# Patient Record
Sex: MALE | Race: WHITE | NOT HISPANIC OR LATINO | Employment: FULL TIME | URBAN - METROPOLITAN AREA
[De-identification: names, ages, dates, MRNs, and addresses within clinical notes are randomized per-mention and may not be internally consistent; named-entity substitution may affect disease eponyms.]

---

## 2018-08-28 ENCOUNTER — OFFICE VISIT (OUTPATIENT)
Dept: ORTHOPEDICS | Facility: CLINIC | Age: 58
End: 2018-08-28
Payer: COMMERCIAL

## 2018-08-28 VITALS
BODY MASS INDEX: 26.44 KG/M2 | SYSTOLIC BLOOD PRESSURE: 131 MMHG | DIASTOLIC BLOOD PRESSURE: 76 MMHG | WEIGHT: 206 LBS | HEIGHT: 74 IN | HEART RATE: 64 BPM

## 2018-08-28 DIAGNOSIS — M75.42 SUBACROMIAL IMPINGEMENT OF LEFT SHOULDER: ICD-10-CM

## 2018-08-28 DIAGNOSIS — M75.41 SUBACROMIAL IMPINGEMENT, RIGHT: Primary | ICD-10-CM

## 2018-08-28 PROCEDURE — 20610 DRAIN/INJ JOINT/BURSA W/O US: CPT | Mod: 50,,, | Performed by: ORTHOPAEDIC SURGERY

## 2018-08-28 PROCEDURE — 3008F BODY MASS INDEX DOCD: CPT | Mod: ,,, | Performed by: ORTHOPAEDIC SURGERY

## 2018-08-28 PROCEDURE — 99213 OFFICE O/P EST LOW 20 MIN: CPT | Mod: 25,,, | Performed by: ORTHOPAEDIC SURGERY

## 2018-08-28 RX ORDER — ATORVASTATIN CALCIUM 40 MG/1
40 TABLET, FILM COATED ORAL NIGHTLY
COMMUNITY
End: 2022-03-31 | Stop reason: SDUPTHER

## 2018-08-28 RX ORDER — MELOXICAM 15 MG/1
TABLET ORAL
Refills: 1 | COMMUNITY
Start: 2018-06-05 | End: 2021-06-07

## 2018-08-28 RX ORDER — METHYLPREDNISOLONE ACETATE 40 MG/ML
40 INJECTION, SUSPENSION INTRA-ARTICULAR; INTRALESIONAL; INTRAMUSCULAR; SOFT TISSUE
Status: DISCONTINUED | OUTPATIENT
Start: 2018-08-28 | End: 2018-08-28 | Stop reason: HOSPADM

## 2018-08-28 RX ORDER — PANTOPRAZOLE SODIUM 20 MG/1
TABLET, DELAYED RELEASE ORAL
Refills: 3 | COMMUNITY
Start: 2018-06-08 | End: 2019-04-16

## 2018-08-28 RX ORDER — FLUTICASONE PROPIONATE 50 MCG
SPRAY, SUSPENSION (ML) NASAL
COMMUNITY
End: 2022-10-11

## 2018-08-28 RX ORDER — ACETAMINOPHEN, DIPHENHYDRAMINE HCL, PHENYLEPHRINE HCL 325; 25; 5 MG/1; MG/1; MG/1
TABLET ORAL
COMMUNITY
End: 2021-06-07

## 2018-08-28 RX ORDER — AZELASTINE 1 MG/ML
SPRAY, METERED NASAL
COMMUNITY
End: 2021-05-03

## 2018-08-28 RX ADMIN — METHYLPREDNISOLONE ACETATE 40 MG: 40 INJECTION, SUSPENSION INTRA-ARTICULAR; INTRALESIONAL; INTRAMUSCULAR; SOFT TISSUE at 09:08

## 2018-08-28 NOTE — PROCEDURES
Large Joint Aspiration/Injection: R subacromial bursa  Date/Time: 8/28/2018 9:20 AM  Performed by: Zack Siddiqui MD  Authorized by: Zack Siddiqui MD     Consent Done?:  Yes (Verbal)  Indications:  Pain  Procedure site marked: Yes    Timeout: Prior to procedure the correct patient, procedure, and site was verified      Location:  Shoulder  Site:  R subacromial bursa  Prep: Patient was prepped and draped in usual sterile fashion    Needle size:  25 G  Medications:  40 mg methylPREDNISolone acetate 40 mg/mL; 40 mg methylPREDNISolone acetate 40 mg/mL  Patient tolerance:  Patient tolerated the procedure well with no immediate complications  Large Joint Aspiration/Injection: L subacromial bursa  Date/Time: 8/28/2018 9:21 AM  Performed by: Zack Siddiqui MD  Authorized by: Zack Siddiqui MD     Consent Done?:  Yes (Verbal)  Indications:  Pain  Procedure site marked: Yes    Timeout: Prior to procedure the correct patient, procedure, and site was verified      Location:  Shoulder  Site:  L subacromial bursa  Prep: Patient was prepped and draped in usual sterile fashion    Needle size:  25 G  Medications:  40 mg methylPREDNISolone acetate 40 mg/mL; 40 mg methylPREDNISolone acetate 40 mg/mL  Patient tolerance:  Patient tolerated the procedure well with no immediate complications

## 2018-08-28 NOTE — PROGRESS NOTES
[facility]    Subjective:     Chief Complaint:   Chief Complaint   Patient presents with    Right Shoulder - Pain     Right Shoulder pain for about a month, states he started a workout program and that's when he started feeling pain. Hurts to raise arm up. States it hurts to pick objects up as well. Requesting injection    Left Shoulder - Pain     Left Shoulder pain for about a month, states he started a workout program and that's when he started feeling pain. Hurts to raise arm up. States it hurts to pick objects up as well. Requesting injection       History reviewed. No pertinent past medical history.    Past Surgical History:   Procedure Laterality Date    APPENDECTOMY      TONSILLECTOMY         Current Outpatient Medications   Medication Sig    atorvastatin (LIPITOR) 40 MG tablet atorvastatin 40 mg tablet    azelastine (ASTELIN) 137 mcg (0.1 %) nasal spray azelastine 137 mcg (0.1 %) nasal spray aerosol    fluticasone (FLONASE) 50 mcg/actuation nasal spray fluticasone 50 mcg/actuation nasal spray,suspension    melatonin 10 mg Tab melatonin   3mg one daily    meloxicam (MOBIC) 15 MG tablet TK 1 T PO QD    pantoprazole (PROTONIX) 20 MG tablet TK 2 TS PO QD     No current facility-administered medications for this visit.        Review of patient's allergies indicates:  No Known Allergies    Family History   Problem Relation Age of Onset    Cancer Mother 70        colon, breast    Diabetes Mother     Cancer Father 65        prostate    Heart disease Brother         CHF       Social History     Socioeconomic History    Marital status:      Spouse name: Not on file    Number of children: Not on file    Years of education: Not on file    Highest education level: Not on file   Social Needs    Financial resource strain: Not on file    Food insecurity - worry: Not on file    Food insecurity - inability: Not on file    Transportation needs - medical: Not on file    Transportation  needs - non-medical: Not on file   Occupational History     Employer: [employer]   Tobacco Use    Smoking status: Former Smoker     Packs/day: 2.00     Years: 20.00     Pack years: 40.00     Types: Cigarettes     Last attempt to quit: 1993     Years since quittin.3    Smokeless tobacco: Never Used   Substance and Sexual Activity    Alcohol use: Yes     Alcohol/week: 3.6 oz     Types: 6 Cans of beer per week    Drug use: No    Sexual activity: Not on file   Other Topics Concern    Not on file   Social History Narrative    Not on file       History of present illness: In today with a chief complaint of bilateral shoulder pain. The left is worse in the right but both bother him. This been going on for about a month. He's been working out a lot and he thinks that's where it all started. No other trauma.      Review of Systems:    Constitution: Negative for chills, fever, and sweats.  Negative for unexplained weight loss.    HENT:  Negative for headaches and blurry vision.    Cardiovascular:Negative for chest pain or irregular heart beat. Negative for hypertension.    Respiratory:  Negative for cough and shortness of breath.    Gastrointestinal: Negative for abdominal pain, heartburn, melena, nausea, and vomitting.    Genitourinary:  Negative bladder incontinence and dysuria.    Musculoskeletal:  See HPI for details.     Neurological: Negative for numbness.    Psychiatric/Behavioral: Negative for depression.  The patient is not nervous/anxious.      Endocrine: Negative for polyuria    Hematologic/Lymphatic: Negative for bleeding problem.  Does not bruise/bleed easily.    Skin: Negative for poor would healing and rash    Objective:      Physical Examination:    Vital Signs:    Vitals:    18 0854   BP: 131/76   Pulse: 64       Body mass index is 26.45 kg/m².    This a well-developed, well nourished patient in no acute distress.  They are alert and oriented and cooperative to examination.         Patient has full range of motion of the bilateral shoulders. He has positive impingement on the left. Positive impingement on the right. Both his rotator cuff is grossly intact but mildly tender. Spurling sign is negative bilaterally.  Pertinent New Results:    XRAY Report / Interpretation:   AP and lateral of the right shoulder demonstrates a type II acromion. There is acromioclavicular arthrosis. No glenohumeral arthrosis. No fractures or subluxations.    AP and lateral the left shoulder demonstrates acromioclavicular arthrosis. No fractures or subluxations. Type II acromion. No glenohumeral arthrosis. All x-rays again in the office today    Assessment/Plan:      Bilateral impingement syndrome. I injected both shoulders with Depo-Medrol and lidocaine. He will follow-up in 6 weeks      This note was created using Dragon voice recognition software that occasionally misinterpreted phrases or words.

## 2019-04-16 ENCOUNTER — OFFICE VISIT (OUTPATIENT)
Dept: ORTHOPEDICS | Facility: CLINIC | Age: 59
End: 2019-04-16
Payer: COMMERCIAL

## 2019-04-16 VITALS
DIASTOLIC BLOOD PRESSURE: 88 MMHG | HEART RATE: 63 BPM | SYSTOLIC BLOOD PRESSURE: 130 MMHG | BODY MASS INDEX: 28.88 KG/M2 | WEIGHT: 225 LBS | HEIGHT: 74 IN

## 2019-04-16 DIAGNOSIS — M77.01 MEDIAL EPICONDYLITIS OF RIGHT ELBOW: ICD-10-CM

## 2019-04-16 PROCEDURE — 20551 NJX 1 TENDON ORIGIN/INSJ: CPT | Mod: RT,,, | Performed by: ORTHOPAEDIC SURGERY

## 2019-04-16 PROCEDURE — 20551 TENDON ORIGIN: R ELBOW: ICD-10-PCS | Mod: RT,,, | Performed by: ORTHOPAEDIC SURGERY

## 2019-04-16 PROCEDURE — 3008F PR BODY MASS INDEX (BMI) DOCUMENTED: ICD-10-PCS | Mod: ,,, | Performed by: ORTHOPAEDIC SURGERY

## 2019-04-16 PROCEDURE — 73070 X-RAY EXAM OF ELBOW: CPT | Mod: RT,,, | Performed by: ORTHOPAEDIC SURGERY

## 2019-04-16 PROCEDURE — 73070 PR  X-RAY ELBOW 2 VW: ICD-10-PCS | Mod: RT,,, | Performed by: ORTHOPAEDIC SURGERY

## 2019-04-16 PROCEDURE — 99213 PR OFFICE/OUTPT VISIT, EST, LEVL III, 20-29 MIN: ICD-10-PCS | Mod: 25,,, | Performed by: ORTHOPAEDIC SURGERY

## 2019-04-16 PROCEDURE — 99213 OFFICE O/P EST LOW 20 MIN: CPT | Mod: 25,,, | Performed by: ORTHOPAEDIC SURGERY

## 2019-04-16 PROCEDURE — 3008F BODY MASS INDEX DOCD: CPT | Mod: ,,, | Performed by: ORTHOPAEDIC SURGERY

## 2019-04-16 RX ORDER — METHYLPREDNISOLONE ACETATE 40 MG/ML
40 INJECTION, SUSPENSION INTRA-ARTICULAR; INTRALESIONAL; INTRAMUSCULAR; SOFT TISSUE
Status: DISCONTINUED | OUTPATIENT
Start: 2019-04-16 | End: 2019-04-16 | Stop reason: HOSPADM

## 2019-04-16 RX ADMIN — METHYLPREDNISOLONE ACETATE 40 MG: 40 INJECTION, SUSPENSION INTRA-ARTICULAR; INTRALESIONAL; INTRAMUSCULAR; SOFT TISSUE at 09:04

## 2019-04-16 NOTE — PROCEDURES
Tendon Origin: R elbow  Date/Time: 4/16/2019 9:57 AM  Performed by: Zack Siddiqui MD  Authorized by: Zack Siddiqui MD     Consent Done?:  Yes (Verbal)  Timeout: prior to procedure the correct patient, procedure, and site was verified    Indications:  Pain  Site marked: the procedure site was marked    Timeout: prior to procedure the correct patient, procedure, and site was verified    Location:  Elbow  Site:  R elbow  Prep: patient was prepped and draped in usual sterile fashion    Needle size:  25 G  Medications:  40 mg methylPREDNISolone acetate 40 mg/mL  Patient tolerance:  Patient tolerated the procedure well with no immediate complications

## 2019-04-16 NOTE — PROGRESS NOTES
[facility]    Subjective:     Chief Complaint:   Chief Complaint   Patient presents with    Right Elbow - Pain     Right elbow pain x 6 weeks. States that his elbow bothers him the most when he is lifting something up.         History reviewed. No pertinent past medical history.    Past Surgical History:   Procedure Laterality Date    APPENDECTOMY      TONSILLECTOMY         Current Outpatient Medications   Medication Sig    atorvastatin (LIPITOR) 40 MG tablet atorvastatin 40 mg tablet    azelastine (ASTELIN) 137 mcg (0.1 %) nasal spray azelastine 137 mcg (0.1 %) nasal spray aerosol    fluticasone (FLONASE) 50 mcg/actuation nasal spray fluticasone 50 mcg/actuation nasal spray,suspension    melatonin 10 mg Tab melatonin   3mg one daily    meloxicam (MOBIC) 15 MG tablet TK 1 T PO QD     No current facility-administered medications for this visit.        Review of patient's allergies indicates:  No Known Allergies    Family History   Problem Relation Age of Onset    Cancer Mother 70        colon, breast    Diabetes Mother     Cancer Father 65        prostate    Heart disease Brother         CHF       Social History     Socioeconomic History    Marital status:      Spouse name: Not on file    Number of children: Not on file    Years of education: Not on file    Highest education level: Not on file   Occupational History     Employer: [employer]   Social Needs    Financial resource strain: Not on file    Food insecurity:     Worry: Not on file     Inability: Not on file    Transportation needs:     Medical: Not on file     Non-medical: Not on file   Tobacco Use    Smoking status: Former Smoker     Packs/day: 2.00     Years: 20.00     Pack years: 40.00     Types: Cigarettes     Last attempt to quit: 1993     Years since quittin.9    Smokeless tobacco: Never Used   Substance and Sexual Activity    Alcohol use: Yes     Alcohol/week: 3.6 oz     Types: 6 Cans of beer per  week    Drug use: No    Sexual activity: Not on file   Lifestyle    Physical activity:     Days per week: Not on file     Minutes per session: Not on file    Stress: Not on file   Relationships    Social connections:     Talks on phone: Not on file     Gets together: Not on file     Attends Spiritism service: Not on file     Active member of club or organization: Not on file     Attends meetings of clubs or organizations: Not on file     Relationship status: Not on file   Other Topics Concern    Not on file   Social History Narrative    Not on file       History of present illness: Patient comes in today for her right elbow. He's had about 6 weeks of right elbow pain. This all started when he was chopping wood. The pain is in the medial aspect of the right elbow.      Review of Systems:    Constitution: Negative for chills, fever, and sweats.  Negative for unexplained weight loss.    HENT:  Negative for headaches and blurry vision.    Cardiovascular:Negative for chest pain or irregular heart beat. Negative for hypertension.    Respiratory:  Negative for cough and shortness of breath.    Gastrointestinal: Negative for abdominal pain, heartburn, melena, nausea, and vomitting.    Genitourinary:  Negative bladder incontinence and dysuria.    Musculoskeletal:  See HPI for details.     Neurological: Negative for numbness.    Psychiatric/Behavioral: Negative for depression.  The patient is not nervous/anxious.      Endocrine: Negative for polyuria    Hematologic/Lymphatic: Negative for bleeding problem.  Does not bruise/bleed easily.    Skin: Negative for poor would healing and rash    Objective:      Physical Examination:    Vital Signs:    Vitals:    04/16/19 0913   BP: 130/88   Pulse: 63       Body mass index is 28.89 kg/m².    This a well-developed, well nourished patient in no acute distress.  They are alert and oriented and cooperative to examination.        Patient has full range of motion of the right elbow.  He's tender of the medial epicondylar region. He has no pain of the lateral epicondylar region. He has pain with flexion of the fingers but no pain with extension of the fingers. Full range of motion of the left elbow. Spurling sign is negative.  Pertinent New Results:    XRAY Report / Interpretation:   AP and lateral of the right elbow within normal limits no fracture subluxations or osteophytic changes. X-rays were done in the office today.    Assessment/Plan:      Medial epicondylitis. I injected the medial epicondylar region with Depo-Medrol and lidocaine. I started him on physical therapy. Follow-up in 6 weeks if he symptomatic      This note was created using Dragon voice recognition software that occasionally misinterpreted phrases or words.

## 2019-08-08 DIAGNOSIS — Z12.39 SCREENING BREAST EXAMINATION: Primary | ICD-10-CM

## 2019-08-15 ENCOUNTER — HOSPITAL ENCOUNTER (OUTPATIENT)
Dept: RADIOLOGY | Facility: HOSPITAL | Age: 59
Discharge: HOME | End: 2019-08-15
Attending: INTERNAL MEDICINE
Payer: COMMERCIAL

## 2019-08-15 ENCOUNTER — OFFICE VISIT (OUTPATIENT)
Dept: ORTHOPEDICS | Facility: CLINIC | Age: 59
End: 2019-08-15
Payer: COMMERCIAL

## 2019-08-15 VITALS
HEART RATE: 82 BPM | WEIGHT: 225 LBS | SYSTOLIC BLOOD PRESSURE: 112 MMHG | HEIGHT: 74 IN | DIASTOLIC BLOOD PRESSURE: 82 MMHG | BODY MASS INDEX: 28.88 KG/M2

## 2019-08-15 DIAGNOSIS — Z12.39 SCREENING BREAST EXAMINATION: ICD-10-CM

## 2019-08-15 DIAGNOSIS — M75.41 IMPINGEMENT SYNDROME OF RIGHT SHOULDER: Primary | ICD-10-CM

## 2019-08-15 DIAGNOSIS — M77.01 MEDIAL EPICONDYLITIS OF RIGHT ELBOW: ICD-10-CM

## 2019-08-15 LAB
CREAT SERPL-MCNC: 1.1 MG/DL (ref 0.5–1.4)
SAMPLE: NORMAL

## 2019-08-15 PROCEDURE — 20610 LARGE JOINT ASPIRATION/INJECTION: R SUBACROMIAL BURSA: ICD-10-PCS | Mod: RT,S$GLB,, | Performed by: ORTHOPAEDIC SURGERY

## 2019-08-15 PROCEDURE — 74177 CT ABD & PELVIS W/CONTRAST: CPT | Mod: TC,PO

## 2019-08-15 PROCEDURE — 99213 OFFICE O/P EST LOW 20 MIN: CPT | Mod: 25,S$GLB,, | Performed by: ORTHOPAEDIC SURGERY

## 2019-08-15 PROCEDURE — 3008F BODY MASS INDEX DOCD: CPT | Mod: S$GLB,,, | Performed by: ORTHOPAEDIC SURGERY

## 2019-08-15 PROCEDURE — 25500020 PHARM REV CODE 255: Mod: PO | Performed by: INTERNAL MEDICINE

## 2019-08-15 PROCEDURE — 20551 NJX 1 TENDON ORIGIN/INSJ: CPT | Mod: 59,RT,S$GLB, | Performed by: ORTHOPAEDIC SURGERY

## 2019-08-15 PROCEDURE — 3008F PR BODY MASS INDEX (BMI) DOCUMENTED: ICD-10-PCS | Mod: S$GLB,,, | Performed by: ORTHOPAEDIC SURGERY

## 2019-08-15 PROCEDURE — 99213 PR OFFICE/OUTPT VISIT, EST, LEVL III, 20-29 MIN: ICD-10-PCS | Mod: 25,S$GLB,, | Performed by: ORTHOPAEDIC SURGERY

## 2019-08-15 PROCEDURE — 20610 DRAIN/INJ JOINT/BURSA W/O US: CPT | Mod: RT,S$GLB,, | Performed by: ORTHOPAEDIC SURGERY

## 2019-08-15 PROCEDURE — 20551 TENDON ORIGIN: R ELBOW: ICD-10-PCS | Mod: 59,RT,S$GLB, | Performed by: ORTHOPAEDIC SURGERY

## 2019-08-15 RX ORDER — METHYLPREDNISOLONE ACETATE 40 MG/ML
40 INJECTION, SUSPENSION INTRA-ARTICULAR; INTRALESIONAL; INTRAMUSCULAR; SOFT TISSUE
Status: DISCONTINUED | OUTPATIENT
Start: 2019-08-15 | End: 2019-08-15 | Stop reason: HOSPADM

## 2019-08-15 RX ORDER — FAMOTIDINE 40 MG/1
40 TABLET, FILM COATED ORAL 2 TIMES DAILY PRN
Refills: 0 | Status: ON HOLD | COMMUNITY
Start: 2019-07-31 | End: 2021-05-06

## 2019-08-15 RX ORDER — PANTOPRAZOLE SODIUM 20 MG/1
20 TABLET, DELAYED RELEASE ORAL
Refills: 3 | COMMUNITY
Start: 2019-07-23

## 2019-08-15 RX ORDER — CETIRIZINE HYDROCHLORIDE 10 MG/1
TABLET ORAL
Refills: 3 | COMMUNITY
Start: 2019-06-03 | End: 2022-06-09 | Stop reason: SDUPTHER

## 2019-08-15 RX ADMIN — IOHEXOL 100 ML: 350 INJECTION, SOLUTION INTRAVENOUS at 12:08

## 2019-08-15 RX ADMIN — METHYLPREDNISOLONE ACETATE 40 MG: 40 INJECTION, SUSPENSION INTRA-ARTICULAR; INTRALESIONAL; INTRAMUSCULAR; SOFT TISSUE at 03:08

## 2019-08-15 NOTE — PROCEDURES
Large Joint Aspiration/Injection: R subacromial bursa  Date/Time: 8/15/2019 3:17 PM  Performed by: Zack Siddiqui MD  Authorized by: Zack Siddiqui MD     Consent Done?:  Yes (Verbal)  Indications:  Pain  Procedure site marked: Yes    Timeout: Prior to procedure the correct patient, procedure, and site was verified    Anesthesia  Local anesthesia used  Anesthesia: local infiltration  Anesthetic: lidocaine 1% without epinephrine    Location:  Shoulder  Site:  R subacromial bursa  Prep: Patient was prepped and draped in usual sterile fashion    Needle size:  25 G  Medications:  40 mg methylPREDNISolone acetate 40 mg/mL  Patient tolerance:  Patient tolerated the procedure well with no immediate complications  Tendon Origin: R elbow  Date/Time: 8/15/2019 3:17 PM  Performed by: Zack Siddiqui MD  Authorized by: Zack Siddiqui MD     Consent Done?:  Yes (Verbal)  Timeout: prior to procedure the correct patient, procedure, and site was verified    Indications:  Pain  Site marked: the procedure site was marked    Timeout: prior to procedure the correct patient, procedure, and site was verified    Location:  Elbow  Site:  R elbow  Prep: patient was prepped and draped in usual sterile fashion    Needle size:  25 G  Medications:  40 mg methylPREDNISolone acetate 40 mg/mL  Patient tolerance:  Patient tolerated the procedure well with no immediate complications
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
## 2021-04-12 DIAGNOSIS — R31.1 BENIGN MICROSCOPIC HEMATURIA: Primary | ICD-10-CM

## 2021-04-21 ENCOUNTER — HOSPITAL ENCOUNTER (OUTPATIENT)
Dept: RADIOLOGY | Facility: HOSPITAL | Age: 61
Discharge: HOME | End: 2021-04-21
Attending: SPECIALIST
Payer: COMMERCIAL

## 2021-04-21 DIAGNOSIS — R31.1 BENIGN MICROSCOPIC HEMATURIA: ICD-10-CM

## 2021-04-21 PROCEDURE — 76770 US EXAM ABDO BACK WALL COMP: CPT | Mod: TC,PO

## 2021-05-03 ENCOUNTER — HOSPITAL ENCOUNTER (OUTPATIENT)
Dept: RADIOLOGY | Facility: HOSPITAL | Age: 61
Discharge: HOME | End: 2021-05-03
Attending: SPECIALIST
Payer: COMMERCIAL

## 2021-05-03 ENCOUNTER — HOSPITAL ENCOUNTER (OUTPATIENT)
Dept: PREADMISSION TESTING | Facility: HOSPITAL | Age: 61
Discharge: HOME | End: 2021-05-03
Attending: SPECIALIST
Payer: COMMERCIAL

## 2021-05-03 VITALS
WEIGHT: 240.31 LBS | HEIGHT: 74 IN | OXYGEN SATURATION: 97 % | SYSTOLIC BLOOD PRESSURE: 137 MMHG | RESPIRATION RATE: 16 BRPM | DIASTOLIC BLOOD PRESSURE: 87 MMHG | HEART RATE: 66 BPM | BODY MASS INDEX: 30.84 KG/M2

## 2021-05-03 DIAGNOSIS — D41.4 NEOPLASM OF UNCERTAIN BEHAVIOR OF BLADDER: Primary | ICD-10-CM

## 2021-05-03 DIAGNOSIS — D41.4 NEOPLASM OF UNCERTAIN BEHAVIOR OF BLADDER: ICD-10-CM

## 2021-05-03 DIAGNOSIS — Z01.818 PREOP TESTING: Primary | ICD-10-CM

## 2021-05-03 LAB — SARS-COV-2 RDRP RESP QL NAA+PROBE: NEGATIVE

## 2021-05-03 PROCEDURE — 71046 X-RAY EXAM CHEST 2 VIEWS: CPT | Mod: TC

## 2021-05-03 PROCEDURE — 93010 EKG 12-LEAD: ICD-10-PCS | Mod: ,,, | Performed by: INTERNAL MEDICINE

## 2021-05-03 PROCEDURE — U0002 COVID-19 LAB TEST NON-CDC: HCPCS | Performed by: SPECIALIST

## 2021-05-03 PROCEDURE — 93005 ELECTROCARDIOGRAM TRACING: CPT | Performed by: INTERNAL MEDICINE

## 2021-05-03 PROCEDURE — 93010 ELECTROCARDIOGRAM REPORT: CPT | Mod: ,,, | Performed by: INTERNAL MEDICINE

## 2021-05-03 RX ORDER — CEFAZOLIN SODIUM 2 G/50ML
2 SOLUTION INTRAVENOUS ONCE
Status: CANCELLED | OUTPATIENT
Start: 2021-05-06

## 2021-05-03 RX ORDER — GLUCOSAMINE/CHONDRO SU A 500-400 MG
1 TABLET ORAL DAILY
COMMUNITY
End: 2023-04-12

## 2021-05-06 ENCOUNTER — ANESTHESIA EVENT (OUTPATIENT)
Dept: SURGERY | Facility: HOSPITAL | Age: 61
End: 2021-05-06
Payer: COMMERCIAL

## 2021-05-06 ENCOUNTER — ANESTHESIA (OUTPATIENT)
Dept: SURGERY | Facility: HOSPITAL | Age: 61
End: 2021-05-06
Payer: COMMERCIAL

## 2021-05-06 ENCOUNTER — PATIENT MESSAGE (OUTPATIENT)
Dept: RESEARCH | Facility: HOSPITAL | Age: 61
End: 2021-05-06

## 2021-05-06 ENCOUNTER — HOSPITAL ENCOUNTER (OUTPATIENT)
Facility: HOSPITAL | Age: 61
Discharge: HOME | End: 2021-05-06
Attending: SPECIALIST | Admitting: SPECIALIST
Payer: COMMERCIAL

## 2021-05-06 VITALS
WEIGHT: 240.31 LBS | OXYGEN SATURATION: 96 % | HEIGHT: 74 IN | RESPIRATION RATE: 16 BRPM | BODY MASS INDEX: 30.84 KG/M2 | HEART RATE: 70 BPM | SYSTOLIC BLOOD PRESSURE: 150 MMHG | TEMPERATURE: 98 F | DIASTOLIC BLOOD PRESSURE: 83 MMHG

## 2021-05-06 DIAGNOSIS — Z01.818 PREOP TESTING: ICD-10-CM

## 2021-05-06 DIAGNOSIS — C67.9 MALIGNANT NEOPLASM OF URINARY BLADDER, UNSPECIFIED SITE: Primary | ICD-10-CM

## 2021-05-06 PROCEDURE — 63600175 PHARM REV CODE 636 W HCPCS: Performed by: ANESTHESIOLOGY

## 2021-05-06 PROCEDURE — 25000003 PHARM REV CODE 250: Performed by: ANESTHESIOLOGY

## 2021-05-06 PROCEDURE — 37000009 HC ANESTHESIA EA ADD 15 MINS: Performed by: SPECIALIST

## 2021-05-06 PROCEDURE — 27000284 HC CANNULA NASAL: Performed by: ANESTHESIOLOGY

## 2021-05-06 PROCEDURE — 25000003 PHARM REV CODE 250: Performed by: SPECIALIST

## 2021-05-06 PROCEDURE — 25000003 PHARM REV CODE 250: Performed by: NURSE ANESTHETIST, CERTIFIED REGISTERED

## 2021-05-06 PROCEDURE — 71000039 HC RECOVERY, EACH ADD'L HOUR: Performed by: SPECIALIST

## 2021-05-06 PROCEDURE — 27202107 HC XP QUATRO SENSOR: Performed by: ANESTHESIOLOGY

## 2021-05-06 PROCEDURE — 37000008 HC ANESTHESIA 1ST 15 MINUTES: Performed by: SPECIALIST

## 2021-05-06 PROCEDURE — 71000033 HC RECOVERY, INTIAL HOUR: Performed by: SPECIALIST

## 2021-05-06 PROCEDURE — 36000706: Performed by: SPECIALIST

## 2021-05-06 PROCEDURE — 27000080 OPTIME MED/SURG SUP & DEVICES GENERAL CLASSIFICATION: Performed by: SPECIALIST

## 2021-05-06 PROCEDURE — 27000673 HC TUBING BLOOD Y: Performed by: ANESTHESIOLOGY

## 2021-05-06 PROCEDURE — 63600175 PHARM REV CODE 636 W HCPCS: Performed by: NURSE ANESTHETIST, CERTIFIED REGISTERED

## 2021-05-06 PROCEDURE — 27201107 HC STYLET, STANDARD: Performed by: ANESTHESIOLOGY

## 2021-05-06 PROCEDURE — 27000654 HC CATH IV JELCO: Performed by: ANESTHESIOLOGY

## 2021-05-06 PROCEDURE — 36000707: Performed by: SPECIALIST

## 2021-05-06 PROCEDURE — 63600175 PHARM REV CODE 636 W HCPCS

## 2021-05-06 PROCEDURE — 63600175 PHARM REV CODE 636 W HCPCS: Performed by: SPECIALIST

## 2021-05-06 PROCEDURE — 27201423 OPTIME MED/SURG SUP & DEVICES STERILE SUPPLY: Performed by: SPECIALIST

## 2021-05-06 RX ORDER — ONDANSETRON 2 MG/ML
INJECTION INTRAMUSCULAR; INTRAVENOUS
Status: DISCONTINUED | OUTPATIENT
Start: 2021-05-06 | End: 2021-05-06

## 2021-05-06 RX ORDER — ATROPA BELLADONNA AND OPIUM 16.2; 6 MG/1; MG/1
60 SUPPOSITORY RECTAL ONCE
Status: COMPLETED | OUTPATIENT
Start: 2021-05-06 | End: 2021-05-06

## 2021-05-06 RX ORDER — FENTANYL CITRATE 50 UG/ML
INJECTION, SOLUTION INTRAMUSCULAR; INTRAVENOUS
Status: DISCONTINUED | OUTPATIENT
Start: 2021-05-06 | End: 2021-05-06

## 2021-05-06 RX ORDER — HYDROMORPHONE HYDROCHLORIDE 1 MG/ML
0.2 INJECTION, SOLUTION INTRAMUSCULAR; INTRAVENOUS; SUBCUTANEOUS EVERY 5 MIN PRN
Status: DISCONTINUED | OUTPATIENT
Start: 2021-05-06 | End: 2021-05-06 | Stop reason: HOSPADM

## 2021-05-06 RX ORDER — ONDANSETRON 2 MG/ML
4 INJECTION INTRAMUSCULAR; INTRAVENOUS DAILY PRN
Status: DISCONTINUED | OUTPATIENT
Start: 2021-05-06 | End: 2021-05-06 | Stop reason: HOSPADM

## 2021-05-06 RX ORDER — MIDAZOLAM HYDROCHLORIDE 1 MG/ML
INJECTION INTRAMUSCULAR; INTRAVENOUS
Status: DISCONTINUED | OUTPATIENT
Start: 2021-05-06 | End: 2021-05-06

## 2021-05-06 RX ORDER — DEXAMETHASONE SODIUM PHOSPHATE 4 MG/ML
INJECTION, SOLUTION INTRA-ARTICULAR; INTRALESIONAL; INTRAMUSCULAR; INTRAVENOUS; SOFT TISSUE
Status: DISCONTINUED | OUTPATIENT
Start: 2021-05-06 | End: 2021-05-06

## 2021-05-06 RX ORDER — LIDOCAINE HYDROCHLORIDE 10 MG/ML
INJECTION, SOLUTION EPIDURAL; INFILTRATION; INTRACAUDAL; PERINEURAL
Status: DISCONTINUED | OUTPATIENT
Start: 2021-05-06 | End: 2021-05-06

## 2021-05-06 RX ORDER — LIDOCAINE HYDROCHLORIDE 20 MG/ML
JELLY TOPICAL
Status: DISCONTINUED | OUTPATIENT
Start: 2021-05-06 | End: 2021-05-06 | Stop reason: HOSPADM

## 2021-05-06 RX ORDER — ACETAMINOPHEN 10 MG/ML
INJECTION, SOLUTION INTRAVENOUS
Status: DISCONTINUED | OUTPATIENT
Start: 2021-05-06 | End: 2021-05-06

## 2021-05-06 RX ORDER — LABETALOL HYDROCHLORIDE 5 MG/ML
5 INJECTION, SOLUTION INTRAVENOUS ONCE
Status: COMPLETED | OUTPATIENT
Start: 2021-05-06 | End: 2021-05-06

## 2021-05-06 RX ORDER — PROPOFOL 10 MG/ML
VIAL (ML) INTRAVENOUS
Status: DISCONTINUED | OUTPATIENT
Start: 2021-05-06 | End: 2021-05-06

## 2021-05-06 RX ORDER — SODIUM CHLORIDE, SODIUM LACTATE, POTASSIUM CHLORIDE, CALCIUM CHLORIDE 600; 310; 30; 20 MG/100ML; MG/100ML; MG/100ML; MG/100ML
INJECTION, SOLUTION INTRAVENOUS CONTINUOUS PRN
Status: DISCONTINUED | OUTPATIENT
Start: 2021-05-06 | End: 2021-05-06

## 2021-05-06 RX ORDER — ROCURONIUM BROMIDE 10 MG/ML
INJECTION, SOLUTION INTRAVENOUS
Status: DISCONTINUED | OUTPATIENT
Start: 2021-05-06 | End: 2021-05-06

## 2021-05-06 RX ORDER — SUCCINYLCHOLINE CHLORIDE 20 MG/ML
INJECTION INTRAMUSCULAR; INTRAVENOUS
Status: DISCONTINUED | OUTPATIENT
Start: 2021-05-06 | End: 2021-05-06

## 2021-05-06 RX ORDER — CEFAZOLIN SODIUM 2 G/50ML
2 SOLUTION INTRAVENOUS ONCE
Status: COMPLETED | OUTPATIENT
Start: 2021-05-06 | End: 2021-05-06

## 2021-05-06 RX ORDER — OXYCODONE HYDROCHLORIDE 5 MG/1
5 TABLET ORAL
Status: COMPLETED | OUTPATIENT
Start: 2021-05-06 | End: 2021-05-06

## 2021-05-06 RX ORDER — FAMOTIDINE 10 MG/ML
INJECTION INTRAVENOUS
Status: DISCONTINUED | OUTPATIENT
Start: 2021-05-06 | End: 2021-05-06

## 2021-05-06 RX ORDER — HYDROMORPHONE HYDROCHLORIDE 1 MG/ML
INJECTION, SOLUTION INTRAMUSCULAR; INTRAVENOUS; SUBCUTANEOUS
Status: COMPLETED
Start: 2021-05-06 | End: 2021-05-06

## 2021-05-06 RX ORDER — HYDRALAZINE HYDROCHLORIDE 20 MG/ML
10 INJECTION INTRAMUSCULAR; INTRAVENOUS ONCE
Status: COMPLETED | OUTPATIENT
Start: 2021-05-06 | End: 2021-05-06

## 2021-05-06 RX ORDER — SODIUM CHLORIDE 0.9 % (FLUSH) 0.9 %
10 SYRINGE (ML) INJECTION
Status: DISCONTINUED | OUTPATIENT
Start: 2021-05-06 | End: 2021-05-06 | Stop reason: HOSPADM

## 2021-05-06 RX ADMIN — LABETALOL HYDROCHLORIDE 5 MG: 5 INJECTION, SOLUTION INTRAVENOUS at 04:05

## 2021-05-06 RX ADMIN — SUCCINYLCHOLINE CHLORIDE 120 MG: 20 INJECTION, SOLUTION INTRAMUSCULAR; INTRAVENOUS at 03:05

## 2021-05-06 RX ADMIN — DEXAMETHASONE SODIUM PHOSPHATE 8 MG: 4 INJECTION, SOLUTION INTRAMUSCULAR; INTRAVENOUS at 03:05

## 2021-05-06 RX ADMIN — HYDROMORPHONE HYDROCHLORIDE 0.2 MG: 1 INJECTION, SOLUTION INTRAMUSCULAR; INTRAVENOUS; SUBCUTANEOUS at 04:05

## 2021-05-06 RX ADMIN — ATROPA BELLADONNA AND OPIUM 60 MG: 16.2; 6 SUPPOSITORY RECTAL at 04:05

## 2021-05-06 RX ADMIN — FENTANYL CITRATE 50 MCG: 50 INJECTION INTRAMUSCULAR; INTRAVENOUS at 03:05

## 2021-05-06 RX ADMIN — SODIUM CHLORIDE, SODIUM LACTATE, POTASSIUM CHLORIDE, AND CALCIUM CHLORIDE: .6; .31; .03; .02 INJECTION, SOLUTION INTRAVENOUS at 03:05

## 2021-05-06 RX ADMIN — OXYCODONE HYDROCHLORIDE 5 MG: 5 TABLET ORAL at 04:05

## 2021-05-06 RX ADMIN — FAMOTIDINE 20 MG: 10 INJECTION, SOLUTION INTRAVENOUS at 03:05

## 2021-05-06 RX ADMIN — HYDROMORPHONE HYDROCHLORIDE 0.2 MG: 1 INJECTION, SOLUTION INTRAMUSCULAR; INTRAVENOUS; SUBCUTANEOUS at 05:05

## 2021-05-06 RX ADMIN — LIDOCAINE HYDROCHLORIDE 50 MG: 10 INJECTION, SOLUTION EPIDURAL; INFILTRATION; INTRACAUDAL; PERINEURAL at 03:05

## 2021-05-06 RX ADMIN — ACETAMINOPHEN 1000 MG: 10 INJECTION, SOLUTION INTRAVENOUS at 03:05

## 2021-05-06 RX ADMIN — MIDAZOLAM HYDROCHLORIDE 1 MG: 1 INJECTION, SOLUTION INTRAMUSCULAR; INTRAVENOUS at 03:05

## 2021-05-06 RX ADMIN — PROPOFOL 150 MG: 10 INJECTION, EMULSION INTRAVENOUS at 03:05

## 2021-05-06 RX ADMIN — ROCURONIUM BROMIDE 5 MG: 10 INJECTION, SOLUTION INTRAVENOUS at 03:05

## 2021-05-06 RX ADMIN — ONDANSETRON 4 MG: 2 INJECTION INTRAMUSCULAR; INTRAVENOUS at 03:05

## 2021-05-06 RX ADMIN — CEFAZOLIN SODIUM 2 G: 2 SOLUTION INTRAVENOUS at 03:05

## 2021-05-06 RX ADMIN — HYDRALAZINE HYDROCHLORIDE 10 MG: 20 INJECTION INTRAMUSCULAR; INTRAVENOUS at 05:05

## 2021-06-02 DIAGNOSIS — C67.9 BLADDER CANCER: Primary | ICD-10-CM

## 2021-06-04 ENCOUNTER — TELEPHONE (OUTPATIENT)
Dept: FAMILY MEDICINE | Facility: CLINIC | Age: 61
End: 2021-06-04

## 2021-06-07 ENCOUNTER — OFFICE VISIT (OUTPATIENT)
Dept: FAMILY MEDICINE | Facility: CLINIC | Age: 61
End: 2021-06-07
Payer: COMMERCIAL

## 2021-06-07 VITALS
BODY MASS INDEX: 30.95 KG/M2 | HEART RATE: 78 BPM | SYSTOLIC BLOOD PRESSURE: 134 MMHG | WEIGHT: 241.19 LBS | DIASTOLIC BLOOD PRESSURE: 82 MMHG | HEIGHT: 74 IN | OXYGEN SATURATION: 96 %

## 2021-06-07 DIAGNOSIS — Z12.5 SCREENING PSA (PROSTATE SPECIFIC ANTIGEN): ICD-10-CM

## 2021-06-07 DIAGNOSIS — K21.9 GASTROESOPHAGEAL REFLUX DISEASE, UNSPECIFIED WHETHER ESOPHAGITIS PRESENT: ICD-10-CM

## 2021-06-07 DIAGNOSIS — E78.5 HYPERLIPIDEMIA WITH TARGET LOW DENSITY LIPOPROTEIN (LDL) CHOLESTEROL LESS THAN 130 MG/DL: Primary | ICD-10-CM

## 2021-06-07 DIAGNOSIS — R03.0 ELEVATED BLOOD PRESSURE READING: ICD-10-CM

## 2021-06-07 PROCEDURE — 99203 PR OFFICE/OUTPT VISIT, NEW, LEVL III, 30-44 MIN: ICD-10-PCS | Mod: 25,S$GLB,, | Performed by: FAMILY MEDICINE

## 2021-06-07 PROCEDURE — 90471 TDAP VACCINE GREATER THAN OR EQUAL TO 7YO IM: ICD-10-PCS | Mod: S$GLB,,, | Performed by: FAMILY MEDICINE

## 2021-06-07 PROCEDURE — 1126F AMNT PAIN NOTED NONE PRSNT: CPT | Mod: S$GLB,,, | Performed by: FAMILY MEDICINE

## 2021-06-07 PROCEDURE — 90715 TDAP VACCINE GREATER THAN OR EQUAL TO 7YO IM: ICD-10-PCS | Mod: S$GLB,,, | Performed by: FAMILY MEDICINE

## 2021-06-07 PROCEDURE — 1126F PR PAIN SEVERITY QUANTIFIED, NO PAIN PRESENT: ICD-10-PCS | Mod: S$GLB,,, | Performed by: FAMILY MEDICINE

## 2021-06-07 PROCEDURE — 99999 PR PBB SHADOW E&M-EST. PATIENT-LVL III: CPT | Mod: PBBFAC,,, | Performed by: FAMILY MEDICINE

## 2021-06-07 PROCEDURE — 90471 IMMUNIZATION ADMIN: CPT | Mod: S$GLB,,, | Performed by: FAMILY MEDICINE

## 2021-06-07 PROCEDURE — 90715 TDAP VACCINE 7 YRS/> IM: CPT | Mod: S$GLB,,, | Performed by: FAMILY MEDICINE

## 2021-06-07 PROCEDURE — 3008F BODY MASS INDEX DOCD: CPT | Mod: CPTII,S$GLB,, | Performed by: FAMILY MEDICINE

## 2021-06-07 PROCEDURE — 3008F PR BODY MASS INDEX (BMI) DOCUMENTED: ICD-10-PCS | Mod: CPTII,S$GLB,, | Performed by: FAMILY MEDICINE

## 2021-06-07 PROCEDURE — 99203 OFFICE O/P NEW LOW 30 MIN: CPT | Mod: 25,S$GLB,, | Performed by: FAMILY MEDICINE

## 2021-06-07 PROCEDURE — 99999 PR PBB SHADOW E&M-EST. PATIENT-LVL III: ICD-10-PCS | Mod: PBBFAC,,, | Performed by: FAMILY MEDICINE

## 2021-06-07 RX ORDER — MELATONIN 10 MG
CAPSULE ORAL
COMMUNITY

## 2021-06-08 ENCOUNTER — LAB VISIT (OUTPATIENT)
Dept: LAB | Facility: HOSPITAL | Age: 61
End: 2021-06-08
Attending: FAMILY MEDICINE
Payer: COMMERCIAL

## 2021-06-08 DIAGNOSIS — Z12.5 SCREENING PSA (PROSTATE SPECIFIC ANTIGEN): ICD-10-CM

## 2021-06-08 DIAGNOSIS — R03.0 ELEVATED BLOOD PRESSURE READING: ICD-10-CM

## 2021-06-08 DIAGNOSIS — E78.5 HYPERLIPIDEMIA WITH TARGET LOW DENSITY LIPOPROTEIN (LDL) CHOLESTEROL LESS THAN 130 MG/DL: ICD-10-CM

## 2021-06-08 DIAGNOSIS — K21.9 GASTROESOPHAGEAL REFLUX DISEASE, UNSPECIFIED WHETHER ESOPHAGITIS PRESENT: ICD-10-CM

## 2021-06-08 LAB
ALBUMIN SERPL BCP-MCNC: 3.7 G/DL (ref 3.5–5.2)
ALP SERPL-CCNC: 82 U/L (ref 55–135)
ALT SERPL W/O P-5'-P-CCNC: 34 U/L (ref 10–44)
ANION GAP SERPL CALC-SCNC: 6 MMOL/L (ref 8–16)
AST SERPL-CCNC: 24 U/L (ref 10–40)
BASOPHILS # BLD AUTO: 0.07 K/UL (ref 0–0.2)
BASOPHILS NFR BLD: 1 % (ref 0–1.9)
BILIRUB SERPL-MCNC: 0.6 MG/DL (ref 0.1–1)
BUN SERPL-MCNC: 15 MG/DL (ref 8–23)
CALCIUM SERPL-MCNC: 8.8 MG/DL (ref 8.7–10.5)
CHLORIDE SERPL-SCNC: 109 MMOL/L (ref 95–110)
CHOLEST SERPL-MCNC: 156 MG/DL (ref 120–199)
CHOLEST/HDLC SERPL: 3.2 {RATIO} (ref 2–5)
CO2 SERPL-SCNC: 26 MMOL/L (ref 23–29)
CREAT SERPL-MCNC: 1.1 MG/DL (ref 0.5–1.4)
DIFFERENTIAL METHOD: ABNORMAL
EOSINOPHIL # BLD AUTO: 0 K/UL (ref 0–0.5)
EOSINOPHIL NFR BLD: 0 % (ref 0–8)
ERYTHROCYTE [DISTWIDTH] IN BLOOD BY AUTOMATED COUNT: 13.1 % (ref 11.5–14.5)
EST. GFR  (AFRICAN AMERICAN): >60 ML/MIN/1.73 M^2
EST. GFR  (NON AFRICAN AMERICAN): >60 ML/MIN/1.73 M^2
GLUCOSE SERPL-MCNC: 105 MG/DL (ref 70–110)
HCT VFR BLD AUTO: 42.3 % (ref 40–54)
HCV AB SERPL QL IA: NEGATIVE
HDLC SERPL-MCNC: 49 MG/DL (ref 40–75)
HDLC SERPL: 31.4 % (ref 20–50)
HGB BLD-MCNC: 14.1 G/DL (ref 14–18)
HIV 1+2 AB+HIV1 P24 AG SERPL QL IA: NEGATIVE
IMM GRANULOCYTES # BLD AUTO: 0.03 K/UL (ref 0–0.04)
IMM GRANULOCYTES NFR BLD AUTO: 0.4 % (ref 0–0.5)
LDLC SERPL CALC-MCNC: 80.2 MG/DL (ref 63–159)
LYMPHOCYTES # BLD AUTO: 1.5 K/UL (ref 1–4.8)
LYMPHOCYTES NFR BLD: 21 % (ref 18–48)
MCH RBC QN AUTO: 31.3 PG (ref 27–31)
MCHC RBC AUTO-ENTMCNC: 33.3 G/DL (ref 32–36)
MCV RBC AUTO: 94 FL (ref 82–98)
MONOCYTES # BLD AUTO: 0.6 K/UL (ref 0.3–1)
MONOCYTES NFR BLD: 8.1 % (ref 4–15)
NEUTROPHILS # BLD AUTO: 5.1 K/UL (ref 1.8–7.7)
NEUTROPHILS NFR BLD: 69.5 % (ref 38–73)
NONHDLC SERPL-MCNC: 107 MG/DL
NRBC BLD-RTO: 0 /100 WBC
PLATELET # BLD AUTO: 262 K/UL (ref 150–450)
PMV BLD AUTO: 10.2 FL (ref 9.2–12.9)
POTASSIUM SERPL-SCNC: 4.5 MMOL/L (ref 3.5–5.1)
PROT SERPL-MCNC: 6.5 G/DL (ref 6–8.4)
RBC # BLD AUTO: 4.51 M/UL (ref 4.6–6.2)
SODIUM SERPL-SCNC: 141 MMOL/L (ref 136–145)
TRIGL SERPL-MCNC: 134 MG/DL (ref 30–150)
WBC # BLD AUTO: 7.28 K/UL (ref 3.9–12.7)

## 2021-06-08 PROCEDURE — 86703 HIV-1/HIV-2 1 RESULT ANTBDY: CPT | Performed by: FAMILY MEDICINE

## 2021-06-08 PROCEDURE — 36415 COLL VENOUS BLD VENIPUNCTURE: CPT | Performed by: FAMILY MEDICINE

## 2021-06-08 PROCEDURE — 80061 LIPID PANEL: CPT | Performed by: FAMILY MEDICINE

## 2021-06-08 PROCEDURE — 80053 COMPREHEN METABOLIC PANEL: CPT | Performed by: FAMILY MEDICINE

## 2021-06-08 PROCEDURE — 86803 HEPATITIS C AB TEST: CPT | Performed by: FAMILY MEDICINE

## 2021-06-08 PROCEDURE — 85025 COMPLETE CBC W/AUTO DIFF WBC: CPT | Performed by: FAMILY MEDICINE

## 2021-06-11 ENCOUNTER — TELEPHONE (OUTPATIENT)
Dept: FAMILY MEDICINE | Facility: CLINIC | Age: 61
End: 2021-06-11

## 2021-08-25 ENCOUNTER — TELEPHONE (OUTPATIENT)
Dept: FAMILY MEDICINE | Facility: CLINIC | Age: 61
End: 2021-08-25

## 2021-08-25 DIAGNOSIS — U07.1 COVID-19: Primary | ICD-10-CM

## 2021-08-26 ENCOUNTER — NURSE TRIAGE (OUTPATIENT)
Dept: ADMINISTRATIVE | Facility: CLINIC | Age: 61
End: 2021-08-26

## 2021-08-28 ENCOUNTER — INFUSION (OUTPATIENT)
Dept: INFECTIOUS DISEASES | Facility: HOSPITAL | Age: 61
End: 2021-08-28
Attending: STUDENT IN AN ORGANIZED HEALTH CARE EDUCATION/TRAINING PROGRAM
Payer: COMMERCIAL

## 2021-08-28 VITALS
RESPIRATION RATE: 18 BRPM | OXYGEN SATURATION: 94 % | HEART RATE: 68 BPM | SYSTOLIC BLOOD PRESSURE: 134 MMHG | DIASTOLIC BLOOD PRESSURE: 87 MMHG | TEMPERATURE: 98 F

## 2021-08-28 DIAGNOSIS — U07.1 COVID-19: ICD-10-CM

## 2021-08-28 PROCEDURE — 63600175 PHARM REV CODE 636 W HCPCS: Performed by: STUDENT IN AN ORGANIZED HEALTH CARE EDUCATION/TRAINING PROGRAM

## 2021-08-28 PROCEDURE — 25000003 PHARM REV CODE 250: Performed by: STUDENT IN AN ORGANIZED HEALTH CARE EDUCATION/TRAINING PROGRAM

## 2021-08-28 PROCEDURE — M0243 CASIRIVI AND IMDEVI INFUSION: HCPCS | Performed by: STUDENT IN AN ORGANIZED HEALTH CARE EDUCATION/TRAINING PROGRAM

## 2021-08-28 RX ORDER — ALBUTEROL SULFATE 90 UG/1
2 AEROSOL, METERED RESPIRATORY (INHALATION)
Status: DISCONTINUED | OUTPATIENT
Start: 2021-08-28 | End: 2022-03-21 | Stop reason: CLARIF

## 2021-08-28 RX ORDER — DIPHENHYDRAMINE HYDROCHLORIDE 50 MG/ML
25 INJECTION INTRAMUSCULAR; INTRAVENOUS ONCE AS NEEDED
Status: DISCONTINUED | OUTPATIENT
Start: 2021-08-28 | End: 2022-06-09

## 2021-08-28 RX ORDER — ONDANSETRON 4 MG/1
4 TABLET, ORALLY DISINTEGRATING ORAL ONCE AS NEEDED
Status: DISPENSED | OUTPATIENT
Start: 2021-08-28 | End: 2033-01-23

## 2021-08-28 RX ORDER — ACETAMINOPHEN 325 MG/1
650 TABLET ORAL ONCE AS NEEDED
Status: ACTIVE | OUTPATIENT
Start: 2021-08-28 | End: 2033-01-23

## 2021-08-28 RX ORDER — SODIUM CHLORIDE 0.9 % (FLUSH) 0.9 %
10 SYRINGE (ML) INJECTION
Status: DISCONTINUED | OUTPATIENT
Start: 2021-08-28 | End: 2022-06-09

## 2021-08-28 RX ORDER — EPINEPHRINE 0.3 MG/.3ML
0.3 INJECTION SUBCUTANEOUS
Status: DISCONTINUED | OUTPATIENT
Start: 2021-08-28 | End: 2022-03-21 | Stop reason: CLARIF

## 2021-08-28 RX ADMIN — CASIRIVIMAB AND IMDEVIMAB 600 MG: 600; 600 INJECTION, SOLUTION, CONCENTRATE INTRAVENOUS at 08:08

## 2021-08-28 RX ADMIN — SODIUM CHLORIDE: 0.9 INJECTION, SOLUTION INTRAVENOUS at 08:08

## 2021-09-10 ENCOUNTER — HOSPITAL ENCOUNTER (OUTPATIENT)
Dept: RADIOLOGY | Facility: HOSPITAL | Age: 61
Discharge: HOME | End: 2021-09-10
Attending: SPECIALIST
Payer: COMMERCIAL

## 2021-09-10 DIAGNOSIS — C67.9 BLADDER CANCER: ICD-10-CM

## 2021-09-10 LAB
CREAT SERPL-MCNC: 1.2 MG/DL (ref 0.5–1.4)
SAMPLE: NORMAL

## 2021-09-10 PROCEDURE — 25500020 PHARM REV CODE 255: Mod: PO | Performed by: SPECIALIST

## 2021-09-10 PROCEDURE — 82565 ASSAY OF CREATININE: CPT | Mod: PO

## 2021-09-10 RX ADMIN — IOHEXOL 100 ML: 350 INJECTION, SOLUTION INTRAVENOUS at 08:09

## 2021-09-21 ENCOUNTER — TELEPHONE (OUTPATIENT)
Dept: ADMINISTRATIVE | Facility: OTHER | Age: 61
End: 2021-09-21

## 2021-10-18 ENCOUNTER — TELEPHONE (OUTPATIENT)
Dept: FAMILY MEDICINE | Facility: CLINIC | Age: 61
End: 2021-10-18

## 2021-10-20 DIAGNOSIS — M25.551 RIGHT HIP PAIN: Primary | ICD-10-CM

## 2021-10-20 DIAGNOSIS — M70.61 TROCHANTERIC BURSITIS OF RIGHT HIP: ICD-10-CM

## 2021-10-26 ENCOUNTER — HOSPITAL ENCOUNTER (OUTPATIENT)
Dept: RADIOLOGY | Facility: HOSPITAL | Age: 61
Discharge: HOME | End: 2021-10-26
Attending: ORTHOPAEDIC SURGERY
Payer: COMMERCIAL

## 2021-10-26 ENCOUNTER — OFFICE VISIT (OUTPATIENT)
Dept: ORTHOPEDICS | Facility: CLINIC | Age: 61
End: 2021-10-26
Payer: COMMERCIAL

## 2021-10-26 VITALS — HEIGHT: 74 IN | WEIGHT: 241 LBS | BODY MASS INDEX: 30.93 KG/M2 | RESPIRATION RATE: 16 BRPM

## 2021-10-26 DIAGNOSIS — M25.551 RIGHT HIP PAIN: ICD-10-CM

## 2021-10-26 DIAGNOSIS — M54.17 LUMBOSACRAL RADICULOPATHY: ICD-10-CM

## 2021-10-26 DIAGNOSIS — M25.551 RIGHT HIP PAIN: Primary | ICD-10-CM

## 2021-10-26 DIAGNOSIS — M47.816 SPONDYLOSIS OF LUMBAR REGION WITHOUT MYELOPATHY OR RADICULOPATHY: Primary | ICD-10-CM

## 2021-10-26 DIAGNOSIS — M47.816 LUMBAR SPONDYLOSIS: Primary | ICD-10-CM

## 2021-10-26 DIAGNOSIS — M25.559 HIP PAIN: ICD-10-CM

## 2021-10-26 PROCEDURE — 1160F RVW MEDS BY RX/DR IN RCRD: CPT | Mod: CPTII,S$GLB,, | Performed by: ORTHOPAEDIC SURGERY

## 2021-10-26 PROCEDURE — 3008F BODY MASS INDEX DOCD: CPT | Mod: CPTII,S$GLB,, | Performed by: ORTHOPAEDIC SURGERY

## 2021-10-26 PROCEDURE — 73521 XR HIPS BILATERAL 2 VIEW INCL AP PELVIS: ICD-10-PCS | Mod: 26,,, | Performed by: RADIOLOGY

## 2021-10-26 PROCEDURE — 1160F PR REVIEW ALL MEDS BY PRESCRIBER/CLIN PHARMACIST DOCUMENTED: ICD-10-PCS | Mod: CPTII,S$GLB,, | Performed by: ORTHOPAEDIC SURGERY

## 2021-10-26 PROCEDURE — 73521 X-RAY EXAM HIPS BI 2 VIEWS: CPT | Mod: 26,,, | Performed by: RADIOLOGY

## 2021-10-26 PROCEDURE — 1159F PR MEDICATION LIST DOCUMENTED IN MEDICAL RECORD: ICD-10-PCS | Mod: CPTII,S$GLB,, | Performed by: ORTHOPAEDIC SURGERY

## 2021-10-26 PROCEDURE — 99204 OFFICE O/P NEW MOD 45 MIN: CPT | Mod: S$GLB,,, | Performed by: ORTHOPAEDIC SURGERY

## 2021-10-26 PROCEDURE — 1159F MED LIST DOCD IN RCRD: CPT | Mod: CPTII,S$GLB,, | Performed by: ORTHOPAEDIC SURGERY

## 2021-10-26 PROCEDURE — 3008F PR BODY MASS INDEX (BMI) DOCUMENTED: ICD-10-PCS | Mod: CPTII,S$GLB,, | Performed by: ORTHOPAEDIC SURGERY

## 2021-10-26 PROCEDURE — 99204 PR OFFICE/OUTPT VISIT, NEW, LEVL IV, 45-59 MIN: ICD-10-PCS | Mod: S$GLB,,, | Performed by: ORTHOPAEDIC SURGERY

## 2021-10-26 PROCEDURE — 99999 PR PBB SHADOW E&M-EST. PATIENT-LVL III: ICD-10-PCS | Mod: PBBFAC,,, | Performed by: ORTHOPAEDIC SURGERY

## 2021-10-26 PROCEDURE — 99999 PR PBB SHADOW E&M-EST. PATIENT-LVL III: CPT | Mod: PBBFAC,,, | Performed by: ORTHOPAEDIC SURGERY

## 2021-10-26 PROCEDURE — 73521 X-RAY EXAM HIPS BI 2 VIEWS: CPT | Mod: TC,PN

## 2021-11-01 ENCOUNTER — OFFICE VISIT (OUTPATIENT)
Dept: SPINE | Facility: CLINIC | Age: 61
End: 2021-11-01
Payer: COMMERCIAL

## 2021-11-01 VITALS — HEIGHT: 74 IN | WEIGHT: 240.94 LBS | BODY MASS INDEX: 30.92 KG/M2

## 2021-11-01 DIAGNOSIS — M54.9 DORSALGIA, UNSPECIFIED: ICD-10-CM

## 2021-11-01 DIAGNOSIS — G89.29 CHRONIC RIGHT-SIDED LOW BACK PAIN WITHOUT SCIATICA: Primary | ICD-10-CM

## 2021-11-01 DIAGNOSIS — M54.50 CHRONIC RIGHT-SIDED LOW BACK PAIN WITHOUT SCIATICA: Primary | ICD-10-CM

## 2021-11-01 DIAGNOSIS — M54.17 LUMBOSACRAL RADICULOPATHY: ICD-10-CM

## 2021-11-01 DIAGNOSIS — M47.816 LUMBAR SPONDYLOSIS: ICD-10-CM

## 2021-11-01 PROCEDURE — 1160F PR REVIEW ALL MEDS BY PRESCRIBER/CLIN PHARMACIST DOCUMENTED: ICD-10-PCS | Mod: CPTII,S$GLB,, | Performed by: PHYSICAL MEDICINE & REHABILITATION

## 2021-11-01 PROCEDURE — 99204 OFFICE O/P NEW MOD 45 MIN: CPT | Mod: S$GLB,,, | Performed by: PHYSICAL MEDICINE & REHABILITATION

## 2021-11-01 PROCEDURE — 99204 PR OFFICE/OUTPT VISIT, NEW, LEVL IV, 45-59 MIN: ICD-10-PCS | Mod: S$GLB,,, | Performed by: PHYSICAL MEDICINE & REHABILITATION

## 2021-11-01 PROCEDURE — 1159F MED LIST DOCD IN RCRD: CPT | Mod: CPTII,S$GLB,, | Performed by: PHYSICAL MEDICINE & REHABILITATION

## 2021-11-01 PROCEDURE — 3008F BODY MASS INDEX DOCD: CPT | Mod: CPTII,S$GLB,, | Performed by: PHYSICAL MEDICINE & REHABILITATION

## 2021-11-01 PROCEDURE — 1160F RVW MEDS BY RX/DR IN RCRD: CPT | Mod: CPTII,S$GLB,, | Performed by: PHYSICAL MEDICINE & REHABILITATION

## 2021-11-01 PROCEDURE — 3008F PR BODY MASS INDEX (BMI) DOCUMENTED: ICD-10-PCS | Mod: CPTII,S$GLB,, | Performed by: PHYSICAL MEDICINE & REHABILITATION

## 2021-11-01 PROCEDURE — 1159F PR MEDICATION LIST DOCUMENTED IN MEDICAL RECORD: ICD-10-PCS | Mod: CPTII,S$GLB,, | Performed by: PHYSICAL MEDICINE & REHABILITATION

## 2021-11-10 ENCOUNTER — HOSPITAL ENCOUNTER (OUTPATIENT)
Dept: RADIOLOGY | Facility: HOSPITAL | Age: 61
Discharge: HOME | End: 2021-11-10
Attending: PHYSICAL MEDICINE & REHABILITATION
Payer: COMMERCIAL

## 2021-11-10 DIAGNOSIS — M54.9 DORSALGIA, UNSPECIFIED: ICD-10-CM

## 2021-11-10 PROCEDURE — 72148 MRI LUMBAR SPINE W/O DYE: CPT | Mod: TC,PO

## 2021-11-15 ENCOUNTER — OFFICE VISIT (OUTPATIENT)
Dept: SPINE | Facility: CLINIC | Age: 61
End: 2021-11-15
Payer: COMMERCIAL

## 2021-11-15 DIAGNOSIS — G89.29 CHRONIC RIGHT-SIDED LOW BACK PAIN WITHOUT SCIATICA: Primary | ICD-10-CM

## 2021-11-15 DIAGNOSIS — M54.50 CHRONIC RIGHT-SIDED LOW BACK PAIN WITHOUT SCIATICA: Primary | ICD-10-CM

## 2021-11-15 PROCEDURE — 99213 OFFICE O/P EST LOW 20 MIN: CPT | Mod: S$GLB,,, | Performed by: PHYSICAL MEDICINE & REHABILITATION

## 2021-11-15 PROCEDURE — 99213 PR OFFICE/OUTPT VISIT, EST, LEVL III, 20-29 MIN: ICD-10-PCS | Mod: S$GLB,,, | Performed by: PHYSICAL MEDICINE & REHABILITATION

## 2021-11-15 PROCEDURE — 1159F MED LIST DOCD IN RCRD: CPT | Mod: CPTII,S$GLB,, | Performed by: PHYSICAL MEDICINE & REHABILITATION

## 2021-11-15 PROCEDURE — 1160F RVW MEDS BY RX/DR IN RCRD: CPT | Mod: CPTII,S$GLB,, | Performed by: PHYSICAL MEDICINE & REHABILITATION

## 2021-11-15 PROCEDURE — 1159F PR MEDICATION LIST DOCUMENTED IN MEDICAL RECORD: ICD-10-PCS | Mod: CPTII,S$GLB,, | Performed by: PHYSICAL MEDICINE & REHABILITATION

## 2021-11-15 PROCEDURE — 1160F PR REVIEW ALL MEDS BY PRESCRIBER/CLIN PHARMACIST DOCUMENTED: ICD-10-PCS | Mod: CPTII,S$GLB,, | Performed by: PHYSICAL MEDICINE & REHABILITATION

## 2021-11-28 ENCOUNTER — PATIENT MESSAGE (OUTPATIENT)
Dept: FAMILY MEDICINE | Facility: CLINIC | Age: 61
End: 2021-11-28
Payer: COMMERCIAL

## 2021-12-07 ENCOUNTER — OFFICE VISIT (OUTPATIENT)
Dept: FAMILY MEDICINE | Facility: CLINIC | Age: 61
End: 2021-12-07
Payer: COMMERCIAL

## 2021-12-07 VITALS
DIASTOLIC BLOOD PRESSURE: 82 MMHG | HEART RATE: 73 BPM | OXYGEN SATURATION: 97 % | HEIGHT: 74 IN | BODY MASS INDEX: 31.91 KG/M2 | SYSTOLIC BLOOD PRESSURE: 132 MMHG | WEIGHT: 248.69 LBS | RESPIRATION RATE: 17 BRPM

## 2021-12-07 DIAGNOSIS — Z12.5 SCREENING PSA (PROSTATE SPECIFIC ANTIGEN): ICD-10-CM

## 2021-12-07 DIAGNOSIS — R03.0 ELEVATED BLOOD PRESSURE READING: Primary | ICD-10-CM

## 2021-12-07 DIAGNOSIS — K21.9 GASTROESOPHAGEAL REFLUX DISEASE, UNSPECIFIED WHETHER ESOPHAGITIS PRESENT: ICD-10-CM

## 2021-12-07 DIAGNOSIS — Z12.11 SCREEN FOR COLON CANCER: ICD-10-CM

## 2021-12-07 DIAGNOSIS — E78.5 HYPERLIPIDEMIA WITH TARGET LOW DENSITY LIPOPROTEIN (LDL) CHOLESTEROL LESS THAN 130 MG/DL: ICD-10-CM

## 2021-12-07 PROCEDURE — 99213 OFFICE O/P EST LOW 20 MIN: CPT | Mod: S$GLB,,, | Performed by: FAMILY MEDICINE

## 2021-12-07 PROCEDURE — 99213 PR OFFICE/OUTPT VISIT, EST, LEVL III, 20-29 MIN: ICD-10-PCS | Mod: S$GLB,,, | Performed by: FAMILY MEDICINE

## 2021-12-07 PROCEDURE — 99999 PR PBB SHADOW E&M-EST. PATIENT-LVL IV: CPT | Mod: PBBFAC,,, | Performed by: FAMILY MEDICINE

## 2021-12-07 PROCEDURE — 99999 PR PBB SHADOW E&M-EST. PATIENT-LVL IV: ICD-10-PCS | Mod: PBBFAC,,, | Performed by: FAMILY MEDICINE

## 2021-12-08 ENCOUNTER — TELEPHONE (OUTPATIENT)
Dept: FAMILY MEDICINE | Facility: CLINIC | Age: 61
End: 2021-12-08
Payer: COMMERCIAL

## 2021-12-08 ENCOUNTER — PATIENT MESSAGE (OUTPATIENT)
Dept: GASTROENTEROLOGY | Facility: CLINIC | Age: 61
End: 2021-12-08
Payer: COMMERCIAL

## 2022-03-07 ENCOUNTER — OFFICE VISIT (OUTPATIENT)
Dept: GASTROENTEROLOGY | Facility: CLINIC | Age: 62
End: 2022-03-07
Payer: COMMERCIAL

## 2022-03-07 VITALS
DIASTOLIC BLOOD PRESSURE: 82 MMHG | SYSTOLIC BLOOD PRESSURE: 139 MMHG | BODY MASS INDEX: 30.47 KG/M2 | HEIGHT: 74 IN | WEIGHT: 237.44 LBS | HEART RATE: 74 BPM

## 2022-03-07 DIAGNOSIS — R14.0 BLOATING: ICD-10-CM

## 2022-03-07 DIAGNOSIS — R14.2 BELCHING: ICD-10-CM

## 2022-03-07 DIAGNOSIS — K21.9 GASTROESOPHAGEAL REFLUX DISEASE, UNSPECIFIED WHETHER ESOPHAGITIS PRESENT: ICD-10-CM

## 2022-03-07 DIAGNOSIS — R10.13 EPIGASTRIC PAIN: Primary | ICD-10-CM

## 2022-03-07 DIAGNOSIS — Z01.818 PREOP TESTING: ICD-10-CM

## 2022-03-07 PROCEDURE — 1159F PR MEDICATION LIST DOCUMENTED IN MEDICAL RECORD: ICD-10-PCS | Mod: CPTII,S$GLB,,

## 2022-03-07 PROCEDURE — 99999 PR PBB SHADOW E&M-EST. PATIENT-LVL V: CPT | Mod: PBBFAC,,,

## 2022-03-07 PROCEDURE — 99214 PR OFFICE/OUTPT VISIT, EST, LEVL IV, 30-39 MIN: ICD-10-PCS | Mod: S$GLB,,,

## 2022-03-07 PROCEDURE — 3075F SYST BP GE 130 - 139MM HG: CPT | Mod: CPTII,S$GLB,,

## 2022-03-07 PROCEDURE — 3008F PR BODY MASS INDEX (BMI) DOCUMENTED: ICD-10-PCS | Mod: CPTII,S$GLB,,

## 2022-03-07 PROCEDURE — 3075F PR MOST RECENT SYSTOLIC BLOOD PRESS GE 130-139MM HG: ICD-10-PCS | Mod: CPTII,S$GLB,,

## 2022-03-07 PROCEDURE — 1159F MED LIST DOCD IN RCRD: CPT | Mod: CPTII,S$GLB,,

## 2022-03-07 PROCEDURE — 3008F BODY MASS INDEX DOCD: CPT | Mod: CPTII,S$GLB,,

## 2022-03-07 PROCEDURE — 99214 OFFICE O/P EST MOD 30 MIN: CPT | Mod: S$GLB,,,

## 2022-03-07 PROCEDURE — 1160F RVW MEDS BY RX/DR IN RCRD: CPT | Mod: CPTII,S$GLB,,

## 2022-03-07 PROCEDURE — 99999 PR PBB SHADOW E&M-EST. PATIENT-LVL V: ICD-10-PCS | Mod: PBBFAC,,,

## 2022-03-07 PROCEDURE — 3079F DIAST BP 80-89 MM HG: CPT | Mod: CPTII,S$GLB,,

## 2022-03-07 PROCEDURE — 3079F PR MOST RECENT DIASTOLIC BLOOD PRESSURE 80-89 MM HG: ICD-10-PCS | Mod: CPTII,S$GLB,,

## 2022-03-07 PROCEDURE — 1160F PR REVIEW ALL MEDS BY PRESCRIBER/CLIN PHARMACIST DOCUMENTED: ICD-10-PCS | Mod: CPTII,S$GLB,,

## 2022-03-07 RX ORDER — MELOXICAM 15 MG/1
TABLET ORAL
COMMUNITY
Start: 2022-01-26 | End: 2022-03-31 | Stop reason: SDUPTHER

## 2022-03-07 RX ORDER — SUCRALFATE 1 G/1
1 TABLET ORAL
Qty: 40 TABLET | Refills: 0 | Status: SHIPPED | OUTPATIENT
Start: 2022-03-07 | End: 2022-03-14

## 2022-03-07 NOTE — PROGRESS NOTES
Subjective:       Patient ID: Jose Singh is a 61 y.o. male Body mass index is 30.48 kg/m².    Chief Complaint: Abdominal Pain    This patient is new to me.     Gastroesophageal Reflux  He complains of abdominal pain (reports intermittent epigastric pain/cramp that started ~2 weeks ago; rated a 4/10 currently; pain increases to 10/10 during episodes) and belching. He reports no chest pain, no choking, no coughing, no dysphagia, no early satiety, no globus sensation, no heartburn, no hoarse voice, no nausea, no sore throat or no water brash. This is a recurrent problem. The problem has been unchanged. The symptoms are aggravated by certain foods (symptoms worsen after eating popcorn). Associated symptoms include weight loss (intentional; reports eating healthier meals). Pertinent negatives include no anemia, fatigue, melena, muscle weakness or orthopnea. He also reports bloating. Risk factors include NSAIDs, caffeine use and obesity (Currently taking Meloxicam). He has tried a PPI and an antacid (currently taking Protonix 40 mg daily; past treatments: OTC TUMs and pepto bismol) for the symptoms. The treatment provided moderate relief. Past procedures include an EGD. Past procedures do not include an abdominal ultrasound, esophageal manometry, esophageal pH monitoring, H. pylori antibody titer or a UGI. Past invasive treatments do not include gastroplasty, gastroplication or reflux surgery.     Review of Systems   Constitutional: Positive for weight loss (intentional; reports eating healthier meals). Negative for activity change, appetite change, chills, diaphoresis, fatigue, fever and unexpected weight change.   HENT: Negative for hoarse voice, sore throat and trouble swallowing.    Respiratory: Negative for cough and choking.    Cardiovascular: Negative for chest pain.   Gastrointestinal: Positive for abdominal pain (reports intermittent epigastric pain/cramp that started ~2 weeks ago; rated a 4/10 currently;  pain increases to 10/10 during episodes). Negative for abdominal distention, anal bleeding, blood in stool, constipation, diarrhea, dysphagia, heartburn, melena, nausea, rectal pain and vomiting.   Musculoskeletal: Negative for muscle weakness.       No LMP for male patient.  Past Medical History:   Diagnosis Date    GERD (gastroesophageal reflux disease)     Hematuria     Hyperlipidemia      Past Surgical History:   Procedure Laterality Date    APPENDECTOMY      COLONOSCOPY      CYSTOGRAM      CYSTOSCOPY N/A 2021    Procedure: CYSTOSCOPY;  Surgeon: Yao Duffy MD;  Location: [facility];  Service: Urology;  Laterality: N/A;    LUMBAR FUSION  2015    TONSILLECTOMY      UPPER GASTROINTESTINAL ENDOSCOPY       Family History   Problem Relation Age of Onset    Cancer Mother 70        colon, breast    Diabetes Mother     Colon cancer Mother     Cancer Father 65        prostate    Heart disease Brother         CHF    Stomach cancer Neg Hx     Rectal cancer Neg Hx     Colon polyps Neg Hx     Crohn's disease Neg Hx     Ulcerative colitis Neg Hx      Social History     Tobacco Use    Smoking status: Former Smoker     Packs/day: 2.00     Years: 20.00     Pack years: 40.00     Types: Cigarettes     Quit date: 1993     Years since quittin.8    Smokeless tobacco: Never Used   Substance Use Topics    Alcohol use: Yes     Alcohol/week: 6.0 standard drinks     Types: 6 Cans of beer per week     Comment: SOCIALLY     Drug use: No     Wt Readings from Last 10 Encounters:   22 107.7 kg (237 lb 7 oz)   21 112.8 kg (248 lb 10.9 oz)   21 109.3 kg (240 lb 15.4 oz)   10/26/21 109.3 kg (241 lb)   21 109.4 kg (241 lb 2.9 oz)   21 109 kg (240 lb 4.8 oz)   21 109 kg (240 lb 4.8 oz)   08/15/19 102.1 kg (225 lb)   19 102.1 kg (225 lb)   18 93.4 kg (206 lb)     Lab Results   Component Value Date    WBC 7.28 2021    HGB 14.1 2021    HCT  42.3 06/08/2021    MCV 94 06/08/2021     06/08/2021     CMP  Sodium   Date Value Ref Range Status   06/08/2021 141 136 - 145 mmol/L Final     Potassium   Date Value Ref Range Status   06/08/2021 4.5 3.5 - 5.1 mmol/L Final     Chloride   Date Value Ref Range Status   06/08/2021 109 95 - 110 mmol/L Final     CO2   Date Value Ref Range Status   06/08/2021 26 23 - 29 mmol/L Final     Glucose   Date Value Ref Range Status   06/08/2021 105 70 - 110 mg/dL Final     BUN   Date Value Ref Range Status   06/08/2021 15 8 - 23 mg/dL Final     Creatinine   Date Value Ref Range Status   06/08/2021 1.1 0.5 - 1.4 mg/dL Final     Calcium   Date Value Ref Range Status   06/08/2021 8.8 8.7 - 10.5 mg/dL Final     Total Protein   Date Value Ref Range Status   06/08/2021 6.5 6.0 - 8.4 g/dL Final     Albumin   Date Value Ref Range Status   06/08/2021 3.7 3.5 - 5.2 g/dL Final     Total Bilirubin   Date Value Ref Range Status   06/08/2021 0.6 0.1 - 1.0 mg/dL Final     Comment:     For infants and newborns, interpretation of results should be based  on gestational age, weight and in agreement with clinical  observations.    Premature Infant recommended reference ranges:  Up to 24 hours.............<8.0 mg/dL  Up to 48 hours............<12.0 mg/dL  3-5 days..................<15.0 mg/dL  6-29 days.................<15.0 mg/dL       Alkaline Phosphatase   Date Value Ref Range Status   06/08/2021 82 55 - 135 U/L Final     AST   Date Value Ref Range Status   06/08/2021 24 10 - 40 U/L Final     ALT   Date Value Ref Range Status   06/08/2021 34 10 - 44 U/L Final     Anion Gap   Date Value Ref Range Status   06/08/2021 6 (L) 8 - 16 mmol/L Final     eGFR if    Date Value Ref Range Status   06/08/2021 >60 >60 mL/min/1.73 m^2 Final     eGFR if non    Date Value Ref Range Status   06/08/2021 >60 >60 mL/min/1.73 m^2 Final     Comment:     Calculation used to obtain the estimated glomerular filtration  rate (eGFR) is  the CKD-EPI equation.        Lab Results   Component Value Date    TSH 1.4 10/13/2004     Reviewed prior medical records including radiology report of abdominal CT 09/10/2021, chest x-ray 05/03/2021 & endoscopy history (see surgical history).    Objective:      Physical Exam  Vitals and nursing note reviewed.   Constitutional:       General: He is not in acute distress.     Appearance: Normal appearance. He is obese. He is not ill-appearing.   HENT:      Mouth/Throat:      Comments: Unable to assess due to COVID-19 concerns.  Eyes:      Extraocular Movements: Extraocular movements intact.      Pupils: Pupils are equal, round, and reactive to light.   Cardiovascular:      Rate and Rhythm: Normal rate and regular rhythm.      Heart sounds: Normal heart sounds.   Pulmonary:      Effort: Pulmonary effort is normal. No respiratory distress.      Breath sounds: Normal breath sounds.   Abdominal:      General: Abdomen is flat. Bowel sounds are normal. There is no distension.      Palpations: Abdomen is soft. There is no mass.      Tenderness: There is no abdominal tenderness. There is no guarding or rebound.      Hernia: No hernia is present.   Skin:     General: Skin is warm and dry.      Coloration: Skin is not jaundiced.   Neurological:      Mental Status: He is alert and oriented to person, place, and time.   Psychiatric:         Attention and Perception: Attention normal.         Mood and Affect: Mood normal.         Speech: Speech normal.         Behavior: Behavior normal.         Assessment:       1. Epigastric pain    2. Gastroesophageal reflux disease, unspecified whether esophagitis present    3. Belching    4. Bloating    5. Preop testing        Plan:         Epigastric pain  -     US Abdomen Limited (LIVER); Future; Expected date: 03/07/2022  -     Lipase; Future; Expected date: 03/07/2022  - START: sucralfate (CARAFATE) 1 gram tablet; Take 1 tablet (1 g total) by mouth 4 (four) times daily before meals and  nightly. for 10 days  Dispense: 40 tablet; Refill: 0  -CONTINUE: Protonix 40 mg daily  -     Case Request Endoscopy: EGD (ESOPHAGOGASTRODUODENOSCOPY)    Gastroesophageal reflux disease, unspecified whether esophagitis present  - schedule EGD, discussed procedure with patient, including risks and benefits, patient verbalized understanding  -discussed about the different types of medications used to treat reflux and how to use them, antacids can be used PRN for breakthrough heartburn symptoms by reducing stomach acid that is already produced, H2 blockers work by limiting the amount acid production, & PPI's work to block acid production and are taken daily, patient verbalized understanding.  -Educated patient on lifestyle modifications to help control/reduce reflux/abdominal pain including: avoid large meals, avoid eating within 2-3 hours of bedtime (avoid late night eating & lying down soon after eating), elevate head of bed if nocturnal symptoms are present, smoking cessation (if current smoker), & weight loss (if overweight).   -Educated to avoid known foods which trigger reflux symptoms & to minimize/avoid high-fat foods, chocolate, caffeine, citrus, alcohol, & tomato products.  -Advised to avoid/limit use of NSAID's, since they can cause GI upset, bleeding, and/or ulcers. If needed, take with food.   - START: sucralfate (CARAFATE) 1 gram tablet; Take 1 tablet (1 g total) by mouth 4 (four) times daily before meals and nightly. for 10 days  Dispense: 40 tablet; Refill: 0  -CONTINUE: Protonix 40 mg daily  -     Case Request Endoscopy: EGD (ESOPHAGOGASTRODUODENOSCOPY)    Belching  - schedule EGD, discussed procedure with patient, including risks and benefits, patient verbalized understanding  - recommended OTC simethicone as directed, such as Phazyme or Gas-x  -discussed with patient about low gas diet: Reduce or eliminate these foods from your diet: Broccoli, Cauliflower, [place] sprouts, Cabbage, Cooked dried  beans, Carbonated beverages (sparkling water, soda, beer, champagne)  -     Case Request Endoscopy: EGD (ESOPHAGOGASTRODUODENOSCOPY)    Bloating   - schedule EGD, discussed procedure with patient, including risks and benefits, patient verbalized understanding  -testing for H. Pylori typically performed during EGD (if not can do lab testing), patient verbalized understanding and agreed with management plan      Follow up in about 4 weeks (around 4/4/2022), or if symptoms worsen or fail to improve.      If no improvement in symptoms or symptoms worsen, call/follow-up at clinic or go to ER.        30 minutes of total time spent on the encounter, which includes face to face time and non-face to face time preparing to see the patient (eg, review of tests), Obtaining and/or reviewing separately obtained history, Documenting clinical information in the electronic or other health record, Independently interpreting results (not separately reported) and communicating results to the patient/family/caregiver, or Care coordination (not separately reported).

## 2022-03-17 ENCOUNTER — HOSPITAL ENCOUNTER (OUTPATIENT)
Dept: RADIOLOGY | Facility: HOSPITAL | Age: 62
Discharge: HOME | End: 2022-03-17
Payer: COMMERCIAL

## 2022-03-17 DIAGNOSIS — R10.13 EPIGASTRIC PAIN: ICD-10-CM

## 2022-03-17 PROCEDURE — 76705 US ABDOMEN LIMITED: ICD-10-PCS | Mod: 26,,, | Performed by: RADIOLOGY

## 2022-03-17 PROCEDURE — 76705 ECHO EXAM OF ABDOMEN: CPT | Mod: 26,,, | Performed by: RADIOLOGY

## 2022-03-17 PROCEDURE — 76705 ECHO EXAM OF ABDOMEN: CPT | Mod: TC

## 2022-03-21 ENCOUNTER — ANESTHESIA EVENT (OUTPATIENT)
Dept: ENDOSCOPY | Facility: HOSPITAL | Age: 62
End: 2022-03-21
Payer: COMMERCIAL

## 2022-03-21 ENCOUNTER — HOSPITAL ENCOUNTER (OUTPATIENT)
Facility: HOSPITAL | Age: 62
Discharge: HOME | End: 2022-03-21
Attending: INTERNAL MEDICINE | Admitting: INTERNAL MEDICINE
Payer: COMMERCIAL

## 2022-03-21 ENCOUNTER — ANESTHESIA (OUTPATIENT)
Dept: ENDOSCOPY | Facility: HOSPITAL | Age: 62
End: 2022-03-21
Payer: COMMERCIAL

## 2022-03-21 DIAGNOSIS — K21.9 GERD (GASTROESOPHAGEAL REFLUX DISEASE): Primary | ICD-10-CM

## 2022-03-21 PROCEDURE — D9220A PRA ANESTHESIA: ICD-10-PCS | Mod: CRNA,,, | Performed by: REGISTERED NURSE

## 2022-03-21 PROCEDURE — D9220A PRA ANESTHESIA: Mod: ANES,,, | Performed by: ANESTHESIOLOGY

## 2022-03-21 PROCEDURE — D9220A PRA ANESTHESIA: Mod: CRNA,,, | Performed by: REGISTERED NURSE

## 2022-03-21 PROCEDURE — 88305 TISSUE EXAM BY PATHOLOGIST: ICD-10-PCS | Mod: 26,,, | Performed by: PATHOLOGY

## 2022-03-21 PROCEDURE — D9220A PRA ANESTHESIA: ICD-10-PCS | Mod: ANES,,, | Performed by: ANESTHESIOLOGY

## 2022-03-21 PROCEDURE — 88305 TISSUE EXAM BY PATHOLOGIST: CPT | Performed by: PATHOLOGY

## 2022-03-21 PROCEDURE — 27201012 HC FORCEPS, HOT/COLD, DISP: Performed by: INTERNAL MEDICINE

## 2022-03-21 PROCEDURE — 43239 EGD BIOPSY SINGLE/MULTIPLE: CPT | Performed by: INTERNAL MEDICINE

## 2022-03-21 PROCEDURE — 25000003 PHARM REV CODE 250: Performed by: INTERNAL MEDICINE

## 2022-03-21 PROCEDURE — 88305 TISSUE EXAM BY PATHOLOGIST: CPT | Mod: 26,,, | Performed by: PATHOLOGY

## 2022-03-21 PROCEDURE — 43239 PR EGD, FLEX, W/BIOPSY, SGL/MULTI: ICD-10-PCS | Mod: ,,, | Performed by: INTERNAL MEDICINE

## 2022-03-21 PROCEDURE — 43239 EGD BIOPSY SINGLE/MULTIPLE: CPT | Mod: ,,, | Performed by: INTERNAL MEDICINE

## 2022-03-21 PROCEDURE — 63600175 PHARM REV CODE 636 W HCPCS: Performed by: REGISTERED NURSE

## 2022-03-21 PROCEDURE — 37000008 HC ANESTHESIA 1ST 15 MINUTES: Performed by: INTERNAL MEDICINE

## 2022-03-21 PROCEDURE — 25000003 PHARM REV CODE 250: Performed by: REGISTERED NURSE

## 2022-03-21 RX ORDER — PROPOFOL 10 MG/ML
VIAL (ML) INTRAVENOUS
Status: DISCONTINUED | OUTPATIENT
Start: 2022-03-21 | End: 2022-03-21

## 2022-03-21 RX ORDER — LIDOCAINE HCL/PF 100 MG/5ML
SYRINGE (ML) INTRAVENOUS
Status: DISCONTINUED | OUTPATIENT
Start: 2022-03-21 | End: 2022-03-21

## 2022-03-21 RX ORDER — SODIUM CHLORIDE 9 MG/ML
INJECTION, SOLUTION INTRAVENOUS CONTINUOUS
Status: DISCONTINUED | OUTPATIENT
Start: 2022-03-21 | End: 2022-03-21 | Stop reason: HOSPADM

## 2022-03-21 RX ADMIN — PROPOFOL 100 MG: 10 INJECTION, EMULSION INTRAVENOUS at 10:03

## 2022-03-21 RX ADMIN — PROPOFOL 50 MG: 10 INJECTION, EMULSION INTRAVENOUS at 10:03

## 2022-03-21 RX ADMIN — LIDOCAINE HYDROCHLORIDE 100 MG: 20 INJECTION INTRAVENOUS at 10:03

## 2022-03-21 RX ADMIN — SODIUM CHLORIDE: 0.9 INJECTION, SOLUTION INTRAVENOUS at 10:03

## 2022-03-21 NOTE — H&P
History & Physical - Short Stay  Gastroenterology      SUBJECTIVE:     Procedure: EGD    Chief Complaint/Indication for Procedure: Abdominal Pain and Reflux    Facility-Administered Medications Prior to Admission   Medication    acetaminophen tablet 650 mg    diphenhydrAMINE injection 25 mg    methylPREDNISolone sodium succinate injection 40 mg    ondansetron disintegrating tablet 4 mg    sodium chloride 0.9% 500 mL flush bag    sodium chloride 0.9% flush 10 mL    [DISCONTINUED] albuterol inhaler 2 puff    [DISCONTINUED] EPINEPHrine (EPIPEN) 0.3 mg/0.3 mL pen injection 0.3 mg     PTA Medications   Medication Sig    melatonin 10 mg Cap Take by mouth.    pantoprazole (PROTONIX) 20 MG tablet TK 2 TS PO QD    sucralfate (CARAFATE) 1 gram tablet TAKE 1 TABLET(1 GRAM) BY MOUTH FOUR TIMES DAILY BEFORE MEALS AND AT NIGHT FOR 10 DAYS    atorvastatin (LIPITOR) 40 MG tablet Take 40 mg by mouth every evening.     cetirizine (ZYRTEC) 10 MG tablet TK 1 T PO  ONCE DAILY    fluticasone (FLONASE) 50 mcg/actuation nasal spray fluticasone 50 mcg/actuation nasal spray,suspension    glucosamine-chondroitin 500-400 mg tablet Take 1 tablet by mouth once daily.    meloxicam (MOBIC) 15 MG tablet     TURMERIC ORAL Take 1 tablet by mouth.       Review of patient's allergies indicates:  No Known Allergies     Past Medical History:   Diagnosis Date    GERD (gastroesophageal reflux disease)     Hematuria 2021    Hyperlipidemia      Past Surgical History:   Procedure Laterality Date    APPENDECTOMY      COLONOSCOPY      CYSTOGRAM  2021    CYSTOSCOPY N/A 5/6/2021    Procedure: CYSTOSCOPY;  Surgeon: Yao Duffy MD;  [facility];  Service: Urology;  Laterality: N/A;    LUMBAR FUSION  2015    TONSILLECTOMY      UPPER GASTROINTESTINAL ENDOSCOPY       Family History   Problem Relation Age of Onset    Cancer Mother 70        colon, breast    Diabetes Mother     Colon cancer Mother     Cancer Father 65         prostate    Heart disease Brother         CHF    Stomach cancer Neg Hx     Rectal cancer Neg Hx     Colon polyps Neg Hx     Crohn's disease Neg Hx     Ulcerative colitis Neg Hx      Social History     Tobacco Use    Smoking status: Former Smoker     Packs/day: 2.00     Years: 20.00     Pack years: 40.00     Types: Cigarettes     Quit date: 1993     Years since quittin.8    Smokeless tobacco: Never Used   Substance Use Topics    Alcohol use: Yes     Alcohol/week: 6.0 standard drinks     Types: 6 Cans of beer per week     Comment: SOCIALLY     Drug use: No         OBJECTIVE:     Vital Signs (Most Recent)  Temp: 97.7 °F (36.5 °C) (22 0950)  Pulse: 60 (22 0950)  Resp: 17 (22 0950)  BP: (!) 141/88 (22 0950)  SpO2: 98 % (2250)    Physical Exam:                                                       GENERAL:  Comfortable, in no acute distress.                                 HEENT EXAM:  Nonicteric.  No adenopathy.  Oropharynx is clear.               NECK:  Supple.                                                               LUNGS:  Clear.                                                               CARDIAC:  Regular rate and rhythm.  S1, S2.  No murmur.                      ABDOMEN:  Soft, positive bowel sounds, nontender.  No hepatosplenomegaly or masses.  No rebound or guarding.                                             EXTREMITIES:  No edema.     MENTAL STATUS:  Normal, alert and oriented.      ASSESSMENT/PLAN:     Assessment: Abdominal Pain and Reflux    Plan: EGD

## 2022-03-21 NOTE — ANESTHESIA PREPROCEDURE EVALUATION
03/21/2022  Jose Singh is a 61 y.o., male.      Pre-op Assessment    I have reviewed the Patient Summary Reports.     I have reviewed the Nursing Notes.    I have reviewed the Medications.     Review of Systems  Anesthesia Hx:  No problems with previous Anesthesia    Social:  Former Smoker    Cardiovascular:   hyperlipidemia    Pulmonary:  Pulmonary Normal    Renal/:  Renal/ Normal     Hepatic/GI:   GERD, well controlled    Musculoskeletal:   Arthritis     Neurological:   Neuromuscular Disease, DDD  Lumbosacral radiculopathy  Spondylosis  spondylisthesis lumbar   Endocrine:  Endocrine Normal        Physical Exam  General: Well nourished, Cooperative, Alert and Oriented    Airway:  Mallampati: I   Mouth Opening: Normal  Neck ROM: Normal ROM        Anesthesia Plan  Type of Anesthesia, risks & benefits discussed:    Anesthesia Type: Gen ETT, Gen Supraglottic Airway, Gen Natural Airway, MAC  Intra-op Monitoring Plan: Standard ASA Monitors  Post Op Pain Control Plan: multimodal analgesia  Induction:  IV  Airway Plan: Direct, Video and Fiberoptic, Post-Induction  Informed Consent: Informed consent signed with the Patient and all parties understand the risks and agree with anesthesia plan.  All questions answered.   ASA Score: 2    Ready For Surgery From Anesthesia Perspective.     .

## 2022-03-21 NOTE — PLAN OF CARE
Patient awake, alert, and oriented.  No complaints of pain or discomfort at present time.  Abdomen soft and nontender;  Ambulates without difficulty;  All instructions given and reviewed with patient and family;  Stable for discharge to home accompanied by wife driving

## 2022-03-21 NOTE — ANESTHESIA POSTPROCEDURE EVALUATION
Anesthesia Post Evaluation    Patient: Jose Singh    Procedure(s) Performed: Procedure(s) (LRB):  EGD (ESOPHAGOGASTRODUODENOSCOPY) (N/A)    Final Anesthesia Type: general      Patient location during evaluation: PACU  Patient participation: Yes- Able to Participate  Level of consciousness: awake and alert and oriented  Post-procedure vital signs: reviewed and stable  Pain management: adequate  Airway patency: patent    PONV status at discharge: No PONV  Anesthetic complications: no      Cardiovascular status: blood pressure returned to baseline and stable  Respiratory status: unassisted and spontaneous ventilation  Hydration status: euvolemic  Follow-up not needed.          Vitals Value Taken Time   /80 03/21/22 1100   Temp 36.6 °C (97.9 °F) 03/21/22 1100   Pulse 80 03/21/22 1100   Resp 20 03/21/22 1100   SpO2 98 % 03/21/22 1100         No case tracking events are documented in the log.      Pain/López Score: López Score: 10 (3/21/2022 11:15 AM)

## 2022-03-21 NOTE — TRANSFER OF CARE
Anesthesia Transfer of Care Note    Patient: Jose Singh    Procedure(s) Performed: Procedure(s) (LRB):  EGD (ESOPHAGOGASTRODUODENOSCOPY) (N/A)    Patient location: GI    Anesthesia Type: general    Transport from OR: Transported from OR on 2-3 L/min O2 by NC with adequate spontaneous ventilation    Post pain: adequate analgesia    Post assessment: tolerated procedure well and no apparent anesthetic complications    Post vital signs: stable    Level of consciousness: sedated    Nausea/Vomiting: no nausea/vomiting    Complications: none    Transfer of care protocol was followed      Last vitals:   Visit Vitals  BP (!) 141/88 (BP Location: Left arm, Patient Position: Lying)   Pulse 60   Temp 36.5 °C (97.7 °F) (Skin)   Resp 17   Wt 103.4 kg (228 lb)   SpO2 98%   BMI 29.27 kg/m²

## 2022-03-21 NOTE — PROVATION PATIENT INSTRUCTIONS
Discharge Summary/Instructions after an Endoscopic Procedure  Patient Name: Jose Singh  Patient MRN: [id number]  Patient YOB: 1960 Monday, March 21, 2022  Saurav Hartley MD  Dear patient,  As a result of recent federal legislation (The Federal Cures Act), you may   receive lab or pathology results from your procedure in your [facility]   account before your physician is able to contact you. Your physician or   their representative will relay the results to you with their   recommendations at their soonest availability.  Thank you,  RESTRICTIONS:  During your procedure today, you received medications for sedation.  These   medications may affect your judgment, balance and coordination.  Therefore,   for 24 hours, you have the following restrictions:   - DO NOT drive a car, operate machinery, make legal/financial decisions,   sign important papers or drink alcohol.    ACTIVITY:  Today: no heavy lifting, straining or running due to procedural   sedation/anesthesia.  The following day: return to full activity including work.  DIET:  Eat and drink normally unless instructed otherwise.     TREATMENT FOR COMMON SIDE EFFECTS:  - Mild abdominal pain, nausea, belching, bloating or excessive gas:  rest,   eat lightly and use a heating pad.  - Sore Throat: treat with throat lozenges and/or gargle with warm salt   water.  - Because air was used during the procedure, expelling large amounts of air   from your rectum or belching is normal.  - If a bowel prep was taken, you may not have a bowel movement for 1-3 days.    This is normal.  SYMPTOMS TO WATCH FOR AND REPORT TO YOUR PHYSICIAN:  1. Abdominal pain or bloating, other than gas cramps.  2. Chest pain.  3. Back pain.  4. Signs of infection such as: chills or fever occurring within 24 hours   after the procedure.  5. Rectal bleeding, which would show as bright red, maroon, or black stools.   (A tablespoon of blood from the rectum is not serious, especially  if   hemorrhoids are present.)  6. Vomiting.  7. Weakness or dizziness.  GO DIRECTLY TO THE NEAREST EMERGENCY ROOM IF YOU HAVE ANY OF THE FOLLOWING:      Difficulty breathing              Chills and/or fever over 101 F   Persistent vomiting and/or vomiting blood   Severe abdominal pain   Severe chest pain   Black, tarry stools   Bleeding- more than one tablespoon   Any other symptom or condition that you feel may need urgent attention  Your doctor recommends these additional instructions:  If any biopsies were taken, your doctors clinic will contact you in 1 to 2   weeks with any results.  - Discharge patient to home.   - Advance diet as tolerated.   - Continue present medications, including PPI daily  - Await pathology results.  For questions, problems or results please call your physician - Saurav Hartley MD at Work:  [phone].  [facility], EMERGENCY ROOM PHONE NUMBER: [phone]  IF A COMPLICATION OR EMERGENCY SITUATION ARISES AND YOU ARE UNABLE TO REACH   YOUR PHYSICIAN - GO DIRECTLY TO THE EMERGENCY ROOM.  Saurav Hartley MD  3/21/2022 10:40:16 AM  This report has been verified and signed electronically.  Dear patient,  As a result of recent federal legislation (The Federal Cures Act), you may   receive lab or pathology results from your procedure in your [facility]   account before your physician is able to contact you. Your physician or   their representative will relay the results to you with their   recommendations at their soonest availability.  Thank you,  PROVATION

## 2022-03-22 VITALS
BODY MASS INDEX: 29.27 KG/M2 | SYSTOLIC BLOOD PRESSURE: 113 MMHG | OXYGEN SATURATION: 98 % | TEMPERATURE: 98 F | HEART RATE: 80 BPM | RESPIRATION RATE: 20 BRPM | DIASTOLIC BLOOD PRESSURE: 80 MMHG | WEIGHT: 228 LBS

## 2022-03-23 ENCOUNTER — PATIENT MESSAGE (OUTPATIENT)
Dept: GASTROENTEROLOGY | Facility: CLINIC | Age: 62
End: 2022-03-23
Payer: COMMERCIAL

## 2022-03-25 ENCOUNTER — PATIENT MESSAGE (OUTPATIENT)
Dept: GASTROENTEROLOGY | Facility: CLINIC | Age: 62
End: 2022-03-25
Payer: COMMERCIAL

## 2022-03-29 LAB
FINAL PATHOLOGIC DIAGNOSIS: NORMAL
GROSS: NORMAL

## 2022-03-31 ENCOUNTER — PATIENT MESSAGE (OUTPATIENT)
Dept: FAMILY MEDICINE | Facility: CLINIC | Age: 62
End: 2022-03-31
Payer: COMMERCIAL

## 2022-03-31 RX ORDER — MELOXICAM 15 MG/1
15 TABLET ORAL DAILY PRN
Qty: 30 TABLET | Refills: 1 | Status: SHIPPED | OUTPATIENT
Start: 2022-03-31 | End: 2022-07-11 | Stop reason: SDUPTHER

## 2022-03-31 RX ORDER — ATORVASTATIN CALCIUM 40 MG/1
40 TABLET, FILM COATED ORAL NIGHTLY
Qty: 90 TABLET | Refills: 2 | Status: SHIPPED | OUTPATIENT
Start: 2022-03-31 | End: 2022-12-13 | Stop reason: SDUPTHER

## 2022-03-31 NOTE — TELEPHONE ENCOUNTER
No new care gaps identified.  Powered by [employer]. Reference number: [id number].   3/31/2022 9:11:31 AM CDT

## 2022-03-31 NOTE — TELEPHONE ENCOUNTER
Requested Prescriptions     Pending Prescriptions Disp Refills    meloxicam (MOBIC) 15 MG tablet      atorvastatin (LIPITOR) 40 MG tablet       Sig: Take 1 tablet (40 mg total) by mouth every evening.

## 2022-04-13 ENCOUNTER — TELEPHONE (OUTPATIENT)
Dept: GASTROENTEROLOGY | Facility: CLINIC | Age: 62
End: 2022-04-13
Payer: COMMERCIAL

## 2022-05-26 ENCOUNTER — CLINICAL SUPPORT (OUTPATIENT)
Dept: OTHER | Facility: CLINIC | Age: 62
End: 2022-05-26
Payer: COMMERCIAL

## 2022-05-26 DIAGNOSIS — Z00.8 ENCOUNTER FOR OTHER GENERAL EXAMINATION: ICD-10-CM

## 2022-05-28 VITALS
DIASTOLIC BLOOD PRESSURE: 85 MMHG | WEIGHT: 227.19 LBS | HEIGHT: 74 IN | BODY MASS INDEX: 29.16 KG/M2 | SYSTOLIC BLOOD PRESSURE: 143 MMHG

## 2022-05-28 LAB
HDLC SERPL-MCNC: 37 MG/DL
POC CHOLESTEROL, LDL (DOCK): 82.99 MG/DL
POC CHOLESTEROL, TOTAL: 141 MG/DL
POC GLUCOSE, FASTING: 92 MG/DL (ref 60–110)
TRIGL SERPL-MCNC: 115 MG/DL

## 2022-06-03 ENCOUNTER — TELEPHONE (OUTPATIENT)
Dept: FAMILY MEDICINE | Facility: CLINIC | Age: 62
End: 2022-06-03

## 2022-06-03 NOTE — TELEPHONE ENCOUNTER
Spoke with pt in regards to new pt appointment. Confirm pt's new pt appointment for 06/09/2022. Verbalized that pt needs to bring all his medication bottle to appointment and needs to wear mask in the office. Verbalized to the pt that if he No Show his new pt appointment we will not be able to re-scheduled this appointment. Pt acknowledge understanding.

## 2022-06-09 ENCOUNTER — OFFICE VISIT (OUTPATIENT)
Dept: FAMILY MEDICINE | Facility: CLINIC | Age: 62
End: 2022-06-09
Payer: COMMERCIAL

## 2022-06-09 VITALS
WEIGHT: 238.38 LBS | OXYGEN SATURATION: 98 % | HEIGHT: 74 IN | HEART RATE: 68 BPM | BODY MASS INDEX: 30.59 KG/M2 | DIASTOLIC BLOOD PRESSURE: 84 MMHG | SYSTOLIC BLOOD PRESSURE: 136 MMHG

## 2022-06-09 DIAGNOSIS — M15.9 PRIMARY OSTEOARTHRITIS INVOLVING MULTIPLE JOINTS: ICD-10-CM

## 2022-06-09 DIAGNOSIS — Z87.891 FORMER SMOKER: ICD-10-CM

## 2022-06-09 DIAGNOSIS — J30.9 ALLERGIC RHINITIS, UNSPECIFIED SEASONALITY, UNSPECIFIED TRIGGER: ICD-10-CM

## 2022-06-09 DIAGNOSIS — E78.5 HYPERLIPIDEMIA WITH TARGET LOW DENSITY LIPOPROTEIN (LDL) CHOLESTEROL LESS THAN 130 MG/DL: Primary | ICD-10-CM

## 2022-06-09 DIAGNOSIS — K21.9 GASTROESOPHAGEAL REFLUX DISEASE, UNSPECIFIED WHETHER ESOPHAGITIS PRESENT: ICD-10-CM

## 2022-06-09 PROCEDURE — 99204 OFFICE O/P NEW MOD 45 MIN: CPT | Mod: S$GLB,,, | Performed by: NURSE PRACTITIONER

## 2022-06-09 PROCEDURE — 3079F DIAST BP 80-89 MM HG: CPT | Mod: CPTII,S$GLB,, | Performed by: NURSE PRACTITIONER

## 2022-06-09 PROCEDURE — 3008F BODY MASS INDEX DOCD: CPT | Mod: CPTII,S$GLB,, | Performed by: NURSE PRACTITIONER

## 2022-06-09 PROCEDURE — 1159F MED LIST DOCD IN RCRD: CPT | Mod: CPTII,S$GLB,, | Performed by: NURSE PRACTITIONER

## 2022-06-09 PROCEDURE — 1159F PR MEDICATION LIST DOCUMENTED IN MEDICAL RECORD: ICD-10-PCS | Mod: CPTII,S$GLB,, | Performed by: NURSE PRACTITIONER

## 2022-06-09 PROCEDURE — 3008F PR BODY MASS INDEX (BMI) DOCUMENTED: ICD-10-PCS | Mod: CPTII,S$GLB,, | Performed by: NURSE PRACTITIONER

## 2022-06-09 PROCEDURE — 1160F PR REVIEW ALL MEDS BY PRESCRIBER/CLIN PHARMACIST DOCUMENTED: ICD-10-PCS | Mod: CPTII,S$GLB,, | Performed by: NURSE PRACTITIONER

## 2022-06-09 PROCEDURE — 3075F SYST BP GE 130 - 139MM HG: CPT | Mod: CPTII,S$GLB,, | Performed by: NURSE PRACTITIONER

## 2022-06-09 PROCEDURE — 3079F PR MOST RECENT DIASTOLIC BLOOD PRESSURE 80-89 MM HG: ICD-10-PCS | Mod: CPTII,S$GLB,, | Performed by: NURSE PRACTITIONER

## 2022-06-09 PROCEDURE — 3075F PR MOST RECENT SYSTOLIC BLOOD PRESS GE 130-139MM HG: ICD-10-PCS | Mod: CPTII,S$GLB,, | Performed by: NURSE PRACTITIONER

## 2022-06-09 PROCEDURE — 1160F RVW MEDS BY RX/DR IN RCRD: CPT | Mod: CPTII,S$GLB,, | Performed by: NURSE PRACTITIONER

## 2022-06-09 PROCEDURE — 99204 PR OFFICE/OUTPT VISIT, NEW, LEVL IV, 45-59 MIN: ICD-10-PCS | Mod: S$GLB,,, | Performed by: NURSE PRACTITIONER

## 2022-06-09 RX ORDER — CETIRIZINE HYDROCHLORIDE 10 MG/1
10 TABLET ORAL DAILY
Qty: 90 TABLET | Refills: 3 | Status: SHIPPED | OUTPATIENT
Start: 2022-06-09

## 2022-06-09 NOTE — PROGRESS NOTES
"  SUBJECTIVE:    Patient ID: Jose Singh is a 61 y.o. male.    Chief Complaint: Establish Care (Bottles brought//Pt is here to establish care with a PCP.//Last colonoscopy with Dr. Plummer, couple a years ago.//IAN)      Pt here for new pt appt. Former pt of Dr. Tan  Pt reports hx of microhematuria and underwent cysto with TURBT 1 year ago which was cancerous but no further treatment required- follows regularly with Dr. Duffy. Was having regular f/u cystos which have been clear.   Last cscope with Dr. Plummer a couple years ago, repeat in 5 years.  Recent had EGD with  which showed erythematous mucosa and erosions without bleeding in antrum  Saw Dr. Myles, ortho this week because he fell off roof last week- had xray of hip/shoulder which were negative and overall pain he was having is much better. Takes meloxicam for general arthritis pain which helps alot  Pt asking about lung CA screening as he recently saw info about it- former smoker with 40pyh- denies cough, SOB or wheeze    Clinical Support on 05/26/2022   Component Date Value Ref Range Status    POC Cholesterol, Total 05/26/2022 141  <240 MG/DL Final    POC Cholesterol, HDL 05/26/2022 37 (A) MG/DL Final    POC Cholesterol, LDL 05/26/2022 82.99  <160 MG/DL Final    POC Triglycerides 05/26/2022 115  <160 MG/DL Final    POC Glucose, Fasting 05/26/2022 92  60 - 110 MG/DL Final   Admission on 03/21/2022, Discharged on 03/21/2022   Component Date Value Ref Range Status    Final Pathologic Diagnosis 03/21/2022    Final                    Value:GASTRIC BIOPSIES:  DISTAL APPEARING INNOCUOUS GASTRIC MUCOSA NO HISTOLOGIC ALTERATION  THE MILD AMOUNT CONNECTIVE TISSUE AND CHRONIC INFLAMMATION IN THE LAMINA  [place] ARE WELL WITHIN THE REALM OF NORMAL FOR THIS ANATOMIC SITE      Gross 03/21/2022    Final                    Value:Container Label: Clinic Number/AP Number:  [id number], and "antrum body biopsy  rule out H pylori"  Received in " formalin are 3 soft tan tissue fragments ranging from 3-5 mm in  greatest dimension.  Entirely submitted in PZ[facility] [place]         Past Medical History:   Diagnosis Date    Cancer     bladder CA s/p TURBT    GERD (gastroesophageal reflux disease)     Hematuria 2021    Hyperlipidemia      Past Surgical History:   Procedure Laterality Date    APPENDECTOMY      COLONOSCOPY      CYSTOGRAM  2021    CYSTOSCOPY N/A 5/6/2021    Procedure: CYSTOSCOPY;  Surgeon: Yao Duffy MD;  [facility];  Service: Urology;  Laterality: N/A;    ESOPHAGOGASTRODUODENOSCOPY N/A 3/21/2022    Procedure: EGD (ESOPHAGOGASTRODUODENOSCOPY);  Surgeon: Saurav Hartley MD;  Location: [facility];  Service: Endoscopy;  Laterality: N/A;    LUMBAR FUSION  2015    TONSILLECTOMY      UPPER GASTROINTESTINAL ENDOSCOPY       Family History   Problem Relation Age of Onset    Heart disease Mother     Cancer Mother 70        colon, breast    Diabetes Mother     Colon cancer Mother     COPD Father     Cancer Father 65        prostate    Cancer Brother 58        prostate CA    Heart disease Brother         CHF    Stomach cancer Neg Hx     Rectal cancer Neg Hx     Colon polyps Neg Hx     Crohn's disease Neg Hx     Ulcerative colitis Neg Hx        Marital Status:   Alcohol History:  reports current alcohol use of about 6.0 standard drinks of alcohol per week.  Tobacco History:  reports that he quit smoking about 29 years ago. His smoking use included cigarettes. He has a 40.00 pack-year smoking history. He has never used smokeless tobacco.  Drug History:  reports no history of drug use.    Review of patient's allergies indicates:  No Known Allergies    Current Outpatient Medications:     atorvastatin (LIPITOR) 40 MG tablet, Take 1 tablet (40 mg total) by mouth every evening., Disp: 90 tablet, Rfl: 2    glucosamine-chondroitin 500-400 mg tablet, Take 1 tablet by mouth once daily., Disp: , Rfl:      "melatonin 10 mg Cap, Take by mouth., Disp: , Rfl:     meloxicam (MOBIC) 15 MG tablet, Take 1 tablet (15 mg total) by mouth daily as needed for Pain., Disp: 30 tablet, Rfl: 1    pantoprazole (PROTONIX) 20 MG tablet, TK 2 TS PO QD, Disp: , Rfl: 3    TURMERIC ORAL, Take 1 tablet by mouth., Disp: , Rfl:     cetirizine (ZYRTEC) 10 MG tablet, Take 1 tablet (10 mg total) by mouth once daily., Disp: 90 tablet, Rfl: 3    fluticasone (FLONASE) 50 mcg/actuation nasal spray, fluticasone 50 mcg/actuation nasal spray,suspension, Disp: , Rfl:     Current Facility-Administered Medications:     acetaminophen tablet 650 mg, 650 mg, Oral, Once PRN, Alirio Hunter MD    ondansetron disintegrating tablet 4 mg, 4 mg, Oral, Once PRN, Alirio Hunter MD    Review of Systems   Constitutional: Negative for appetite change, chills and fever.   HENT: Negative for sore throat and trouble swallowing.    Eyes: Negative for visual disturbance.   Respiratory: Negative for cough, shortness of breath and wheezing.    Cardiovascular: Negative for chest pain, palpitations and leg swelling.   Gastrointestinal: Negative for abdominal pain, constipation and diarrhea.   Genitourinary: Negative for dysuria, frequency and hematuria.   Musculoskeletal: Positive for arthralgias (bilat knees, wrists). Negative for back pain (no pain since lumbar surgery) and gait problem.   Skin: Negative for rash.   Neurological: Negative for dizziness, syncope, speech difficulty and numbness.   Psychiatric/Behavioral: Negative for dysphoric mood. The patient is not nervous/anxious.           Objective:      Vitals:    06/09/22 1449   BP: 136/84   Pulse: 68   SpO2: 98%   Weight: 108.1 kg (238 lb 6.4 oz)   Height: 6' 2" (1.88 m)     Physical Exam  Vitals and nursing note reviewed.   Constitutional:       Appearance: He is well-developed.   HENT:      Head: Normocephalic and atraumatic.      Right Ear: Tympanic membrane and ear canal normal.      Left Ear: Tympanic " membrane and ear canal normal.      Mouth/Throat:      Pharynx: No posterior oropharyngeal erythema.   Neck:      Vascular: No carotid bruit.   Cardiovascular:      Rate and Rhythm: Normal rate and regular rhythm.      Heart sounds: No murmur heard.    No friction rub. No gallop.   Pulmonary:      Effort: Pulmonary effort is normal. No respiratory distress.      Breath sounds: Normal breath sounds. No wheezing or rales.   Abdominal:      General: There is no distension.      Palpations: Abdomen is soft.      Tenderness: There is no abdominal tenderness.   Musculoskeletal:      Cervical back: Neck supple.   Lymphadenopathy:      Cervical: No cervical adenopathy.   Skin:     General: Skin is warm and dry.      Findings: No rash.   Neurological:      Mental Status: He is alert and oriented to person, place, and time.      Gait: Gait normal.           Assessment:       1. Hyperlipidemia with target low density lipoprotein (LDL) cholesterol less than 130 mg/dL    2. Gastroesophageal reflux disease, unspecified whether esophagitis present    3. Primary osteoarthritis involving multiple joints    4. Former smoker    5. Allergic rhinitis, unspecified seasonality, unspecified trigger           Plan:       Hyperlipidemia with target low density lipoprotein (LDL) cholesterol less than 130 mg/dL  Comments:  pt advised he's due for repeat lipids  Orders:  -     CBC Auto Differential; Future; Expected date: 06/09/2022  -     Comprehensive Metabolic Panel; Future; Expected date: 06/09/2022  -     TSH w/reflex to FT4; Future; Expected date: 06/09/2022    Gastroesophageal reflux disease, unspecified whether esophagitis present  Comments:  mild erosions on recent EGD- symptoms controlled on PPI    Primary osteoarthritis involving multiple joints  Comments:  stable, discussed trying to reduce meloxicam daily use if possible d/t GI issues and cautioned to always take with food    Former smoker  Comments:  reviewed LDCT guidelines and  offered to place order though pt states he'll wait for now, consider at next appt    Allergic rhinitis, unspecified seasonality, unspecified trigger  Comments:  stable- requesting refill  Orders:  -     cetirizine (ZYRTEC) 10 MG tablet; Take 1 tablet (10 mg total) by mouth once daily.  Dispense: 90 tablet; Refill: 3      Follow up in about 6 months (around 12/9/2022) for labs within next 2 weeks, lipids.        6/11/2022 Maliha Monson NP

## 2022-06-24 LAB
ALBUMIN SERPL-MCNC: 4.3 G/DL (ref 3.8–4.8)
ALBUMIN/GLOB SERPL: 1.8 {RATIO} (ref 1.2–2.2)
ALP SERPL-CCNC: 92 IU/L (ref 44–121)
ALT SERPL-CCNC: 36 IU/L (ref 0–44)
AST SERPL-CCNC: 30 IU/L (ref 0–40)
BASOPHILS # BLD AUTO: 0.1 X10E3/UL (ref 0–0.2)
BASOPHILS NFR BLD AUTO: 1 %
BILIRUB SERPL-MCNC: 0.4 MG/DL (ref 0–1.2)
BUN SERPL-MCNC: 16 MG/DL (ref 8–27)
BUN/CREAT SERPL: 14 (ref 10–24)
CALCIUM SERPL-MCNC: 8.8 MG/DL (ref 8.6–10.2)
CHLORIDE SERPL-SCNC: 106 MMOL/L (ref 96–106)
CO2 SERPL-SCNC: 21 MMOL/L (ref 20–29)
CREAT SERPL-MCNC: 1.15 MG/DL (ref 0.76–1.27)
EOSINOPHIL # BLD AUTO: 0.8 X10E3/UL (ref 0–0.4)
EOSINOPHIL NFR BLD AUTO: 10 %
ERYTHROCYTE [DISTWIDTH] IN BLOOD BY AUTOMATED COUNT: 13.1 % (ref 11.6–15.4)
EST. GFR  (NO RACE VARIABLE): 72 ML/MIN/1.73
GLOBULIN SER CALC-MCNC: 2.4 G/DL (ref 1.5–4.5)
GLUCOSE SERPL-MCNC: 94 MG/DL (ref 65–99)
HCT VFR BLD AUTO: 43.9 % (ref 37.5–51)
HGB BLD-MCNC: 14.4 G/DL (ref 13–17.7)
IMM GRANULOCYTES # BLD AUTO: 0 X10E3/UL (ref 0–0.1)
IMM GRANULOCYTES NFR BLD AUTO: 0 %
LYMPHOCYTES # BLD AUTO: 2.3 X10E3/UL (ref 0.7–3.1)
LYMPHOCYTES NFR BLD AUTO: 28 %
MCH RBC QN AUTO: 30.5 PG (ref 26.6–33)
MCHC RBC AUTO-ENTMCNC: 32.8 G/DL (ref 31.5–35.7)
MCV RBC AUTO: 93 FL (ref 79–97)
MONOCYTES # BLD AUTO: 0.6 X10E3/UL (ref 0.1–0.9)
MONOCYTES NFR BLD AUTO: 7 %
NEUTROPHILS # BLD AUTO: 4.5 X10E3/UL (ref 1.4–7)
NEUTROPHILS NFR BLD AUTO: 54 %
PLATELET # BLD AUTO: 311 X10E3/UL (ref 150–450)
POTASSIUM SERPL-SCNC: 4.5 MMOL/L (ref 3.5–5.2)
PROT SERPL-MCNC: 6.7 G/DL (ref 6–8.5)
RBC # BLD AUTO: 4.72 X10E6/UL (ref 4.14–5.8)
SODIUM SERPL-SCNC: 142 MMOL/L (ref 134–144)
TSH SERPL DL<=0.005 MIU/L-ACNC: 2.35 UIU/ML (ref 0.45–4.5)
WBC # BLD AUTO: 8.3 X10E3/UL (ref 3.4–10.8)

## 2022-07-11 ENCOUNTER — PATIENT MESSAGE (OUTPATIENT)
Dept: ORTHOPEDICS | Facility: CLINIC | Age: 62
End: 2022-07-11
Payer: COMMERCIAL

## 2022-07-11 RX ORDER — MELOXICAM 15 MG/1
15 TABLET ORAL DAILY PRN
Qty: 30 TABLET | Refills: 5 | Status: SHIPPED | OUTPATIENT
Start: 2022-07-11 | End: 2022-12-13 | Stop reason: SDUPTHER

## 2022-07-11 NOTE — TELEPHONE ENCOUNTER
Pt is needing a refill on his Meloxicam. Last office visit 06/09/2022. Next office visit 12/13/2022.

## 2022-07-11 NOTE — TELEPHONE ENCOUNTER
----- Message from Yvette Stapleton sent at 7/11/2022  3:25 PM CDT -----  - pt needs refill on meloxicam   [facility] [address]  [phone]

## 2022-07-29 ENCOUNTER — PATIENT MESSAGE (OUTPATIENT)
Dept: FAMILY MEDICINE | Facility: CLINIC | Age: 62
End: 2022-07-29

## 2022-10-01 ENCOUNTER — PATIENT MESSAGE (OUTPATIENT)
Dept: FAMILY MEDICINE | Facility: CLINIC | Age: 62
End: 2022-10-01

## 2022-10-03 ENCOUNTER — PATIENT MESSAGE (OUTPATIENT)
Dept: FAMILY MEDICINE | Facility: CLINIC | Age: 62
End: 2022-10-03

## 2022-10-11 ENCOUNTER — OFFICE VISIT (OUTPATIENT)
Dept: FAMILY MEDICINE | Facility: CLINIC | Age: 62
End: 2022-10-11
Payer: COMMERCIAL

## 2022-10-11 VITALS
DIASTOLIC BLOOD PRESSURE: 78 MMHG | HEIGHT: 74 IN | WEIGHT: 229 LBS | OXYGEN SATURATION: 98 % | SYSTOLIC BLOOD PRESSURE: 128 MMHG | BODY MASS INDEX: 29.39 KG/M2 | HEART RATE: 72 BPM

## 2022-10-11 DIAGNOSIS — E78.5 DYSLIPIDEMIA: ICD-10-CM

## 2022-10-11 DIAGNOSIS — Z01.818 PREOPERATIVE CLEARANCE: Primary | ICD-10-CM

## 2022-10-11 LAB
EKG 12-LEAD: NORMAL
PR INTERVAL: 178
PRT AXES: NORMAL
QRS DURATION: 88
QT/QTC: NORMAL
VENTRICULAR RATE: 64

## 2022-10-11 PROCEDURE — 99214 PR OFFICE/OUTPT VISIT, EST, LEVL IV, 30-39 MIN: ICD-10-PCS | Mod: 25,S$GLB,, | Performed by: NURSE PRACTITIONER

## 2022-10-11 PROCEDURE — 3008F PR BODY MASS INDEX (BMI) DOCUMENTED: ICD-10-PCS | Mod: CPTII,S$GLB,, | Performed by: NURSE PRACTITIONER

## 2022-10-11 PROCEDURE — 1159F PR MEDICATION LIST DOCUMENTED IN MEDICAL RECORD: ICD-10-PCS | Mod: CPTII,S$GLB,, | Performed by: NURSE PRACTITIONER

## 2022-10-11 PROCEDURE — 93000 ELECTROCARDIOGRAM COMPLETE: CPT | Mod: S$GLB,,, | Performed by: NURSE PRACTITIONER

## 2022-10-11 PROCEDURE — 3074F PR MOST RECENT SYSTOLIC BLOOD PRESSURE < 130 MM HG: ICD-10-PCS | Mod: CPTII,S$GLB,, | Performed by: NURSE PRACTITIONER

## 2022-10-11 PROCEDURE — 3008F BODY MASS INDEX DOCD: CPT | Mod: CPTII,S$GLB,, | Performed by: NURSE PRACTITIONER

## 2022-10-11 PROCEDURE — 3074F SYST BP LT 130 MM HG: CPT | Mod: CPTII,S$GLB,, | Performed by: NURSE PRACTITIONER

## 2022-10-11 PROCEDURE — 3078F DIAST BP <80 MM HG: CPT | Mod: CPTII,S$GLB,, | Performed by: NURSE PRACTITIONER

## 2022-10-11 PROCEDURE — 99214 OFFICE O/P EST MOD 30 MIN: CPT | Mod: 25,S$GLB,, | Performed by: NURSE PRACTITIONER

## 2022-10-11 PROCEDURE — 1160F RVW MEDS BY RX/DR IN RCRD: CPT | Mod: CPTII,S$GLB,, | Performed by: NURSE PRACTITIONER

## 2022-10-11 PROCEDURE — 1159F MED LIST DOCD IN RCRD: CPT | Mod: CPTII,S$GLB,, | Performed by: NURSE PRACTITIONER

## 2022-10-11 PROCEDURE — 3078F PR MOST RECENT DIASTOLIC BLOOD PRESSURE < 80 MM HG: ICD-10-PCS | Mod: CPTII,S$GLB,, | Performed by: NURSE PRACTITIONER

## 2022-10-11 PROCEDURE — 1160F PR REVIEW ALL MEDS BY PRESCRIBER/CLIN PHARMACIST DOCUMENTED: ICD-10-PCS | Mod: CPTII,S$GLB,, | Performed by: NURSE PRACTITIONER

## 2022-10-11 PROCEDURE — 93000 POCT EKG 12-LEAD: ICD-10-PCS | Mod: S$GLB,,, | Performed by: NURSE PRACTITIONER

## 2022-10-11 NOTE — PROGRESS NOTES
SUBJECTIVE:    Patient ID: Jose Singh is a 61 y.o. male.    Chief Complaint: Follow-up (No bottles//Pt is here for a surgical clearance for left rot-tater cuff surgery on 10/24/2022.//Decline flu and pna vaccine.//IAN )    Pt here for preop clearanc- scheduled for left rotator cuff arthroscopy with Dr. Reyes at [facility] on 10/24/2022- found to have rotator cuff and labrum tear on recent MRI. Pt reports no previous issues with anesthesia.  Denies any CP, SOB or palpitations      Office Visit on 10/11/2022   Component Date Value Ref Range Status    EKG 12-Lead 10/11/2022 NSR   Final-Edited    Ventricular Rate 10/11/2022 64   Final-Edited    GA Interval 10/11/2022 178   Final-Edited    QRS Duration 10/11/2022 88   Final-Edited    QT/QTc 10/11/2022 388/400   Final-Edited    PRT Axes 10/11/2022 36  30  47   Final-Edited   Office Visit on 06/09/2022   Component Date Value Ref Range Status    WBC 06/23/2022 8.3  3.4 - 10.8 x10E3/uL Final    RBC 06/23/2022 4.72  4.14 - 5.80 x10E6/uL Final    Hemoglobin 06/23/2022 14.4  13.0 - 17.7 g/dL Final    Hematocrit 06/23/2022 43.9  37.5 - 51.0 % Final    MCV 06/23/2022 93  79 - 97 fL Final    MCH 06/23/2022 30.5  26.6 - 33.0 pg Final    MCHC 06/23/2022 32.8  31.5 - 35.7 g/dL Final    RDW 06/23/2022 13.1  11.6 - 15.4 % Final    Platelets 06/23/2022 311  150 - 450 x10E3/uL Final    Neutrophils 06/23/2022 54  Not Estab. % Final    Lymphs 06/23/2022 28  Not Estab. % Final    Monocytes 06/23/2022 7  Not Estab. % Final    Eos 06/23/2022 10  Not Estab. % Final    Basos 06/23/2022 1  Not Estab. % Final    Neutrophils (Absolute) 06/23/2022 4.5  1.4 - 7.0 x10E3/uL Final    Lymphs (Absolute) 06/23/2022 2.3  0.7 - 3.1 x10E3/uL Final    Monocytes(Absolute) 06/23/2022 0.6  0.1 - 0.9 x10E3/uL Final    Eos (Absolute) 06/23/2022 0.8 (H)  0.0 - 0.4 x10E3/uL Final    Baso (Absolute) 06/23/2022 0.1  0.0 - 0.2 x10E3/uL Final    Immature Granulocytes 06/23/2022 0  Not Estab. % Final    Immature  Grans (Abs) 06/23/2022 0.0  0.0 - 0.1 x10E3/uL Final    Glucose 06/23/2022 94  65 - 99 mg/dL Final    BUN 06/23/2022 16  8 - 27 mg/dL Final    Creatinine 06/23/2022 1.15  0.76 - 1.27 mg/dL Final    eGFR 06/23/2022 72  >59 mL/min/1.73 Final    BUN/Creatinine Ratio 06/23/2022 14  10 - 24 Final    Sodium 06/23/2022 142  134 - 144 mmol/L Final    Potassium 06/23/2022 4.5  3.5 - 5.2 mmol/L Final    Chloride 06/23/2022 106  96 - 106 mmol/L Final    CO2 06/23/2022 21  20 - 29 mmol/L Final    Calcium 06/23/2022 8.8  8.6 - 10.2 mg/dL Final    Protein, Total 06/23/2022 6.7  6.0 - 8.5 g/dL Final    Albumin 06/23/2022 4.3  3.8 - 4.8 g/dL Final    Globulin, Total 06/23/2022 2.4  1.5 - 4.5 g/dL Final    Albumin/Globulin Ratio 06/23/2022 1.8  1.2 - 2.2 Final    Total Bilirubin 06/23/2022 0.4  0.0 - 1.2 mg/dL Final    Alkaline Phosphatase 06/23/2022 92  44 - 121 IU/L Final    AST 06/23/2022 30  0 - 40 IU/L Final    ALT 06/23/2022 36  0 - 44 IU/L Final    TSH 06/23/2022 2.350  0.450 - 4.500 uIU/mL Final   Clinical Support on 05/26/2022   Component Date Value Ref Range Status    POC Cholesterol, Total 05/26/2022 141  <240 MG/DL Final    POC Cholesterol, HDL 05/26/2022 37 (L)  MG/DL Final    POC Cholesterol, LDL 05/26/2022 82.99  <160 MG/DL Final    POC Triglycerides 05/26/2022 115  <160 MG/DL Final    POC Glucose, Fasting 05/26/2022 92  60 - 110 MG/DL Final       Past Medical History:   Diagnosis Date    Cancer     bladder CA s/p TURBT    GERD (gastroesophageal reflux disease)     Hematuria 2021    Hyperlipidemia      Past Surgical History:   Procedure Laterality Date    APPENDECTOMY      COLONOSCOPY      CYSTOGRAM  2021    CYSTOSCOPY N/A 5/6/2021    Procedure: CYSTOSCOPY;  Surgeon: Yao Duffy MD;  Location: [facility];  Service: Urology;  Laterality: N/A;    ESOPHAGOGASTRODUODENOSCOPY N/A 3/21/2022    Procedure: EGD (ESOPHAGOGASTRODUODENOSCOPY);  Surgeon: Saurav Hartley MD;  Location: [facility];  Service: Endoscopy;   Laterality: N/A;    LUMBAR FUSION  2015    TONSILLECTOMY      UPPER GASTROINTESTINAL ENDOSCOPY       Family History   Problem Relation Age of Onset    Heart disease Mother     Cancer Mother 70        colon, breast    Diabetes Mother     Colon cancer Mother     COPD Father     Cancer Father 65        prostate    Cancer Brother 58        prostate CA    Heart disease Brother         CHF    Stomach cancer Neg Hx     Rectal cancer Neg Hx     Colon polyps Neg Hx     Crohn's disease Neg Hx     Ulcerative colitis Neg Hx        The 10-year CVD risk score (Josefina et al., 2008) is: 12.5%    Values used to calculate the score:      Age: 61 years      Sex: Male      Diabetic: No      Tobacco smoker: No      Systolic Blood Pressure: 128 mmHg      Is BP treated: No      HDL Cholesterol: 49 mg/dL      Total Cholesterol: 156 mg/dL     Marital Status:   Alcohol History:  reports current alcohol use of about 6.0 standard drinks per week.  Tobacco History:  reports that he quit smoking about 29 years ago. His smoking use included cigarettes. He has a 40.00 pack-year smoking history. He has never used smokeless tobacco.  Drug History:  reports no history of drug use.    Health Maintenance Topics with due status: Not Due       Topic Last Completion Date    Colorectal Cancer Screening 06/22/2020    TETANUS VACCINE 06/07/2021    Lipid Panel 05/26/2022     Immunization History   Administered Date(s) Administered    Influenza - Quadrivalent - PF *Preferred* (6 months and older) 01/11/2011, 12/10/2013    Influenza Split 12/10/2013    Tdap 06/07/2021    Zoster 05/06/2013    Zoster Recombinant 12/31/2019, 03/02/2020       Review of patient's allergies indicates:  No Known Allergies    Current Outpatient Medications:     atorvastatin (LIPITOR) 40 MG tablet, Take 1 tablet (40 mg total) by mouth every evening., Disp: 90 tablet, Rfl: 2    cetirizine (ZYRTEC) 10 MG tablet, Take 1 tablet (10 mg total) by mouth once daily., Disp: 90  "tablet, Rfl: 3    glucosamine-chondroitin 500-400 mg tablet, Take 1 tablet by mouth once daily., Disp: , Rfl:     melatonin 10 mg Cap, Take by mouth., Disp: , Rfl:     meloxicam (MOBIC) 15 MG tablet, Take 1 tablet (15 mg total) by mouth daily as needed for Pain., Disp: 30 tablet, Rfl: 5    pantoprazole (PROTONIX) 20 MG tablet, TK 2 TS PO QD, Disp: , Rfl: 3    TURMERIC ORAL, Take 1 tablet by mouth., Disp: , Rfl:     Current Facility-Administered Medications:     acetaminophen tablet 650 mg, 650 mg, Oral, Once PRN, Alirio Hunter MD    ondansetron disintegrating tablet 4 mg, 4 mg, Oral, Once PRN, Alirio Hunter MD    Review of Systems   Constitutional:  Negative for appetite change, chills and fever.   Respiratory:  Negative for cough, shortness of breath and wheezing.    Cardiovascular:  Negative for chest pain, palpitations and leg swelling.   Gastrointestinal:  Negative for abdominal pain, constipation and diarrhea.   Genitourinary:  Negative for dysuria, frequency and hematuria.   Musculoskeletal:  Positive for arthralgias (left shoulder pain). Negative for back pain.   Skin:  Negative for rash.   Neurological:  Negative for dizziness, syncope, speech difficulty and numbness.        Objective:      Vitals:    10/11/22 1513   BP: 128/78   Pulse: 72   SpO2: 98%   Weight: 103.9 kg (229 lb)   Height: 6' 2" (1.88 m)     Physical Exam  Vitals and nursing note reviewed.   Constitutional:       General: He is not in acute distress.     Appearance: He is well-developed and normal weight.   HENT:      Head: Normocephalic and atraumatic.   Neck:      Vascular: No carotid bruit.   Cardiovascular:      Rate and Rhythm: Normal rate and regular rhythm.      Heart sounds: No murmur heard.    No friction rub. No gallop.   Pulmonary:      Effort: Pulmonary effort is normal. No respiratory distress.      Breath sounds: Normal breath sounds. No wheezing or rales.   Abdominal:      Palpations: Abdomen is soft.   Musculoskeletal: "      Cervical back: Neck supple.   Lymphadenopathy:      Cervical: No cervical adenopathy.   Skin:     General: Skin is warm and dry.      Findings: No rash.   Neurological:      General: No focal deficit present.      Mental Status: He is alert and oriented to person, place, and time.      Gait: Gait normal.         Assessment:       1. Preoperative clearance    2. Dyslipidemia           Plan:       Preoperative clearance  Comments:  EKG NSR without acute change- cleared for surgery at low risk  Orders:  -     POCT EKG 12-LEAD (NOT FOR [facility] USE)    Dyslipidemia  Comments:  Controlled on last labs    Follow up for as scheduled.          Counseled on age and gender appropriate medical preventative services, including cancer screenings, immunizations, overall nutritional health, need for a consistent exercise regimen and an overall push towards maintaining a vigorous and active lifestyle.      10/11/2022 Maliha Monson NP

## 2022-10-11 NOTE — LETTER
[address]  [place] [address]  Phone: [phone]   [phone]                        MD Kofi Pina MD Chequita Williams, MD Matthew Bassett, PA-C Linda Melerine, NP Jodi Powell, NP Hunter Reed, PA-C      Date: 10/11/2022        Patient: Jose Singh  YOB: 1960      Dear Dr. Reyes:       I saw your patient today for a surgical clearance consult.  It is my medical opinion based on his exam today that he is medically stable and cleared for shoulder surgery.     If you have any questions or concerns, please don't hesitate to contact my office.       Sincerely,       Maliha Monson NP

## 2022-12-13 ENCOUNTER — OFFICE VISIT (OUTPATIENT)
Dept: FAMILY MEDICINE | Facility: CLINIC | Age: 62
End: 2022-12-13
Payer: COMMERCIAL

## 2022-12-13 VITALS
HEIGHT: 74 IN | BODY MASS INDEX: 30.16 KG/M2 | SYSTOLIC BLOOD PRESSURE: 136 MMHG | WEIGHT: 235 LBS | HEART RATE: 68 BPM | DIASTOLIC BLOOD PRESSURE: 86 MMHG

## 2022-12-13 DIAGNOSIS — E78.5 DYSLIPIDEMIA: Primary | ICD-10-CM

## 2022-12-13 DIAGNOSIS — Z85.51 HISTORY OF BLADDER CANCER: ICD-10-CM

## 2022-12-13 DIAGNOSIS — M51.36 DDD (DEGENERATIVE DISC DISEASE), LUMBAR: ICD-10-CM

## 2022-12-13 PROCEDURE — 3008F BODY MASS INDEX DOCD: CPT | Mod: CPTII,S$GLB,, | Performed by: NURSE PRACTITIONER

## 2022-12-13 PROCEDURE — 3079F DIAST BP 80-89 MM HG: CPT | Mod: CPTII,S$GLB,, | Performed by: NURSE PRACTITIONER

## 2022-12-13 PROCEDURE — 1160F RVW MEDS BY RX/DR IN RCRD: CPT | Mod: CPTII,S$GLB,, | Performed by: NURSE PRACTITIONER

## 2022-12-13 PROCEDURE — 99214 OFFICE O/P EST MOD 30 MIN: CPT | Mod: S$GLB,,, | Performed by: NURSE PRACTITIONER

## 2022-12-13 PROCEDURE — 3075F SYST BP GE 130 - 139MM HG: CPT | Mod: CPTII,S$GLB,, | Performed by: NURSE PRACTITIONER

## 2022-12-13 PROCEDURE — 99214 PR OFFICE/OUTPT VISIT, EST, LEVL IV, 30-39 MIN: ICD-10-PCS | Mod: S$GLB,,, | Performed by: NURSE PRACTITIONER

## 2022-12-13 PROCEDURE — 1160F PR REVIEW ALL MEDS BY PRESCRIBER/CLIN PHARMACIST DOCUMENTED: ICD-10-PCS | Mod: CPTII,S$GLB,, | Performed by: NURSE PRACTITIONER

## 2022-12-13 PROCEDURE — 3008F PR BODY MASS INDEX (BMI) DOCUMENTED: ICD-10-PCS | Mod: CPTII,S$GLB,, | Performed by: NURSE PRACTITIONER

## 2022-12-13 PROCEDURE — 1159F MED LIST DOCD IN RCRD: CPT | Mod: CPTII,S$GLB,, | Performed by: NURSE PRACTITIONER

## 2022-12-13 PROCEDURE — 3075F PR MOST RECENT SYSTOLIC BLOOD PRESS GE 130-139MM HG: ICD-10-PCS | Mod: CPTII,S$GLB,, | Performed by: NURSE PRACTITIONER

## 2022-12-13 PROCEDURE — 3079F PR MOST RECENT DIASTOLIC BLOOD PRESSURE 80-89 MM HG: ICD-10-PCS | Mod: CPTII,S$GLB,, | Performed by: NURSE PRACTITIONER

## 2022-12-13 PROCEDURE — 1159F PR MEDICATION LIST DOCUMENTED IN MEDICAL RECORD: ICD-10-PCS | Mod: CPTII,S$GLB,, | Performed by: NURSE PRACTITIONER

## 2022-12-13 RX ORDER — ATORVASTATIN CALCIUM 40 MG/1
40 TABLET, FILM COATED ORAL NIGHTLY
Qty: 90 TABLET | Refills: 2 | Status: SHIPPED | OUTPATIENT
Start: 2022-12-13 | End: 2023-11-15 | Stop reason: SDUPTHER

## 2022-12-13 RX ORDER — MELOXICAM 15 MG/1
15 TABLET ORAL DAILY PRN
Qty: 90 TABLET | Refills: 2 | Status: SHIPPED | OUTPATIENT
Start: 2022-12-13 | End: 2023-11-21 | Stop reason: SDUPTHER

## 2022-12-13 RX ORDER — FLUTICASONE PROPIONATE 50 MCG
1 SPRAY, SUSPENSION (ML) NASAL DAILY
Qty: 16 G | Refills: 2 | Status: SHIPPED | OUTPATIENT
Start: 2022-12-13 | End: 2023-04-12

## 2022-12-20 ENCOUNTER — TELEPHONE (OUTPATIENT)
Dept: FAMILY MEDICINE | Facility: CLINIC | Age: 62
End: 2022-12-20

## 2022-12-20 NOTE — TELEPHONE ENCOUNTER
Pt states he was here on 12/13/22 for a cough. States at the time it wasn't bad so he declined tx, but now cough has worsened and productive. Pt denies any fever/ chills/ SOB. Pt seeing if something could be sent in now.

## 2022-12-20 NOTE — TELEPHONE ENCOUNTER
Spoke to pt verbatim per Luan. Pt voiced  understanding.Offered pt appt tomorrow. Pt refused. States he will go to .

## 2022-12-20 NOTE — TELEPHONE ENCOUNTER
Not familiar with the patient. Given that his sxs are worse he should be evaluated. Based on Raciel note 3 weeks now. If no appts here then urgent care.

## 2022-12-20 NOTE — TELEPHONE ENCOUNTER
----- Message from Maliha Najera sent at 12/20/2022  8:16 AM CST -----  Pt is still not feeling well and needs to be seen. PT #[phone]

## 2023-01-11 LAB
ALBUMIN SERPL-MCNC: 4 G/DL (ref 3.8–4.8)
ALBUMIN/GLOB SERPL: 1.7 {RATIO} (ref 1.2–2.2)
ALP SERPL-CCNC: 83 IU/L (ref 44–121)
ALT SERPL-CCNC: 31 IU/L (ref 0–44)
APPEARANCE UR: CLEAR
AST SERPL-CCNC: 22 IU/L (ref 0–40)
BACTERIA #/AREA URNS HPF: NORMAL /[HPF]
BASOPHILS # BLD AUTO: 0.1 X10E3/UL (ref 0–0.2)
BASOPHILS NFR BLD AUTO: 1 %
BILIRUB SERPL-MCNC: 0.5 MG/DL (ref 0–1.2)
BILIRUB UR QL STRIP: NEGATIVE
BUN SERPL-MCNC: 17 MG/DL (ref 8–27)
BUN/CREAT SERPL: 14 (ref 10–24)
CALCIUM SERPL-MCNC: 8.9 MG/DL (ref 8.6–10.2)
CASTS URNS QL MICRO: NORMAL /LPF
CHLORIDE SERPL-SCNC: 105 MMOL/L (ref 96–106)
CHOLEST SERPL-MCNC: 184 MG/DL (ref 100–199)
CO2 SERPL-SCNC: 23 MMOL/L (ref 20–29)
COLOR UR: YELLOW
CREAT SERPL-MCNC: 1.19 MG/DL (ref 0.76–1.27)
EOSINOPHIL # BLD AUTO: 0.1 X10E3/UL (ref 0–0.4)
EOSINOPHIL NFR BLD AUTO: 1 %
EPI CELLS #/AREA URNS HPF: NORMAL /HPF (ref 0–10)
ERYTHROCYTE [DISTWIDTH] IN BLOOD BY AUTOMATED COUNT: 12.9 % (ref 11.6–15.4)
EST. GFR  (NO RACE VARIABLE): 68 ML/MIN/1.73
GLOBULIN SER CALC-MCNC: 2.4 G/DL (ref 1.5–4.5)
GLUCOSE SERPL-MCNC: 110 MG/DL (ref 70–99)
GLUCOSE UR QL STRIP: NEGATIVE
HCT VFR BLD AUTO: 44.5 % (ref 37.5–51)
HDLC SERPL-MCNC: 49 MG/DL
HGB BLD-MCNC: 14.8 G/DL (ref 13–17.7)
HGB UR QL STRIP: NEGATIVE
IMM GRANULOCYTES # BLD AUTO: 0 X10E3/UL (ref 0–0.1)
IMM GRANULOCYTES NFR BLD AUTO: 0 %
KETONES UR QL STRIP: NEGATIVE
LDLC SERPL CALC-MCNC: 101 MG/DL (ref 0–99)
LEUKOCYTE ESTERASE UR QL STRIP: NEGATIVE
LYMPHOCYTES # BLD AUTO: 1.8 X10E3/UL (ref 0.7–3.1)
LYMPHOCYTES NFR BLD AUTO: 29 %
MCH RBC QN AUTO: 31 PG (ref 26.6–33)
MCHC RBC AUTO-ENTMCNC: 33.3 G/DL (ref 31.5–35.7)
MCV RBC AUTO: 93 FL (ref 79–97)
MICRO URNS: NORMAL
MICRO URNS: NORMAL
MONOCYTES # BLD AUTO: 0.5 X10E3/UL (ref 0.1–0.9)
MONOCYTES NFR BLD AUTO: 8 %
NEUTROPHILS # BLD AUTO: 3.8 X10E3/UL (ref 1.4–7)
NEUTROPHILS NFR BLD AUTO: 61 %
NITRITE UR QL STRIP: NEGATIVE
PH UR STRIP: 6 [PH] (ref 5–7.5)
PLATELET # BLD AUTO: 282 X10E3/UL (ref 150–450)
POTASSIUM SERPL-SCNC: 4.9 MMOL/L (ref 3.5–5.2)
PROT SERPL-MCNC: 6.4 G/DL (ref 6–8.5)
PROT UR QL STRIP: NEGATIVE
RBC # BLD AUTO: 4.78 X10E6/UL (ref 4.14–5.8)
RBC #/AREA URNS HPF: NORMAL /HPF (ref 0–2)
SODIUM SERPL-SCNC: 143 MMOL/L (ref 134–144)
SP GR UR STRIP: 1.02 (ref 1–1.03)
TRIGL SERPL-MCNC: 200 MG/DL (ref 0–149)
TSH SERPL DL<=0.005 MIU/L-ACNC: 2.79 UIU/ML (ref 0.45–4.5)
URINALYSIS REFLEX: NORMAL
UROBILINOGEN UR STRIP-MCNC: 0.2 MG/DL (ref 0.2–1)
VLDLC SERPL CALC-MCNC: 34 MG/DL (ref 5–40)
WBC # BLD AUTO: 6.3 X10E3/UL (ref 3.4–10.8)
WBC #/AREA URNS HPF: NORMAL /HPF (ref 0–5)

## 2023-02-06 ENCOUNTER — PATIENT MESSAGE (OUTPATIENT)
Dept: FAMILY MEDICINE | Facility: CLINIC | Age: 63
End: 2023-02-06

## 2023-02-07 ENCOUNTER — PATIENT MESSAGE (OUTPATIENT)
Dept: FAMILY MEDICINE | Facility: CLINIC | Age: 63
End: 2023-02-07

## 2023-02-07 RX ORDER — ALPRAZOLAM 0.25 MG/1
0.25 TABLET ORAL 2 TIMES DAILY PRN
Qty: 20 TABLET | Refills: 1 | Status: SHIPPED | OUTPATIENT
Start: 2023-02-07 | End: 2023-03-09

## 2023-04-12 ENCOUNTER — OFFICE VISIT (OUTPATIENT)
Dept: FAMILY MEDICINE | Facility: CLINIC | Age: 63
End: 2023-04-12
Payer: COMMERCIAL

## 2023-04-12 VITALS
HEART RATE: 75 BPM | SYSTOLIC BLOOD PRESSURE: 136 MMHG | OXYGEN SATURATION: 96 % | DIASTOLIC BLOOD PRESSURE: 88 MMHG | BODY MASS INDEX: 31.03 KG/M2 | WEIGHT: 241.81 LBS | HEIGHT: 74 IN

## 2023-04-12 DIAGNOSIS — I10 PRIMARY HYPERTENSION: Primary | ICD-10-CM

## 2023-04-12 DIAGNOSIS — E78.5 DYSLIPIDEMIA: ICD-10-CM

## 2023-04-12 PROCEDURE — 3075F PR MOST RECENT SYSTOLIC BLOOD PRESS GE 130-139MM HG: ICD-10-PCS | Mod: CPTII,S$GLB,, | Performed by: NURSE PRACTITIONER

## 2023-04-12 PROCEDURE — 3079F DIAST BP 80-89 MM HG: CPT | Mod: CPTII,S$GLB,, | Performed by: NURSE PRACTITIONER

## 2023-04-12 PROCEDURE — 1159F PR MEDICATION LIST DOCUMENTED IN MEDICAL RECORD: ICD-10-PCS | Mod: CPTII,S$GLB,, | Performed by: NURSE PRACTITIONER

## 2023-04-12 PROCEDURE — 3008F PR BODY MASS INDEX (BMI) DOCUMENTED: ICD-10-PCS | Mod: CPTII,S$GLB,, | Performed by: NURSE PRACTITIONER

## 2023-04-12 PROCEDURE — 99214 PR OFFICE/OUTPT VISIT, EST, LEVL IV, 30-39 MIN: ICD-10-PCS | Mod: S$GLB,,, | Performed by: NURSE PRACTITIONER

## 2023-04-12 PROCEDURE — 1160F RVW MEDS BY RX/DR IN RCRD: CPT | Mod: CPTII,S$GLB,, | Performed by: NURSE PRACTITIONER

## 2023-04-12 PROCEDURE — 1159F MED LIST DOCD IN RCRD: CPT | Mod: CPTII,S$GLB,, | Performed by: NURSE PRACTITIONER

## 2023-04-12 PROCEDURE — 3079F PR MOST RECENT DIASTOLIC BLOOD PRESSURE 80-89 MM HG: ICD-10-PCS | Mod: CPTII,S$GLB,, | Performed by: NURSE PRACTITIONER

## 2023-04-12 PROCEDURE — 99214 OFFICE O/P EST MOD 30 MIN: CPT | Mod: S$GLB,,, | Performed by: NURSE PRACTITIONER

## 2023-04-12 PROCEDURE — 3008F BODY MASS INDEX DOCD: CPT | Mod: CPTII,S$GLB,, | Performed by: NURSE PRACTITIONER

## 2023-04-12 PROCEDURE — 1160F PR REVIEW ALL MEDS BY PRESCRIBER/CLIN PHARMACIST DOCUMENTED: ICD-10-PCS | Mod: CPTII,S$GLB,, | Performed by: NURSE PRACTITIONER

## 2023-04-12 PROCEDURE — 3075F SYST BP GE 130 - 139MM HG: CPT | Mod: CPTII,S$GLB,, | Performed by: NURSE PRACTITIONER

## 2023-04-12 RX ORDER — LOSARTAN POTASSIUM 25 MG/1
25 TABLET ORAL DAILY
Qty: 90 TABLET | Refills: 1 | Status: SHIPPED | OUTPATIENT
Start: 2023-04-12 | End: 2024-04-11

## 2023-04-12 NOTE — PROGRESS NOTES
SUBJECTIVE:    Patient ID: Jose Singh is a 62 y.o. male.    Chief Complaint: Follow-up (No bottle//Pt is here for a check up and to discuss his elevated B/P//Pt c/o headache and flush in the face x 1-2 weeks//IAN )    Pt here sick visit- reports past couple weeks feeling flushed in his face at times with mild headache. Started checking his BP at home and BP ranging 130-160/80-96. Admits was eating salted popcorn nightly when he first noticed BP was up so stopped that. Denies any vision/speech change, no dizziness, CP or SOB.    Pt admits has been under some stress- wife was diagnosed with stage 4 biliary CA- went to [facility] and now on chemo. He had called here and requested something for anxiety- was sent in xanax and reports he's probably taken 4-5 times total. Thinks overall he's handling her illness and is trying to stay optimistic      Office Visit on 12/13/2022   Component Date Value Ref Range Status    WBC 01/10/2023 6.3  3.4 - 10.8 x10E3/uL Final    RBC 01/10/2023 4.78  4.14 - 5.80 x10E6/uL Final    Hemoglobin 01/10/2023 14.8  13.0 - 17.7 g/dL Final    Hematocrit 01/10/2023 44.5  37.5 - 51.0 % Final    MCV 01/10/2023 93  79 - 97 fL Final    MCH 01/10/2023 31.0  26.6 - 33.0 pg Final    MCHC 01/10/2023 33.3  31.5 - 35.7 g/dL Final    RDW 01/10/2023 12.9  11.6 - 15.4 % Final    Platelets 01/10/2023 282  150 - 450 x10E3/uL Final    Neutrophils 01/10/2023 61  Not Estab. % Final    Lymphs 01/10/2023 29  Not Estab. % Final    Monocytes 01/10/2023 8  Not Estab. % Final    Eos 01/10/2023 1  Not Estab. % Final    Basos 01/10/2023 1  Not Estab. % Final    Neutrophils (Absolute) 01/10/2023 3.8  1.4 - 7.0 x10E3/uL Final    Lymphs (Absolute) 01/10/2023 1.8  0.7 - 3.1 x10E3/uL Final    Monocytes(Absolute) 01/10/2023 0.5  0.1 - 0.9 x10E3/uL Final    Eos (Absolute) 01/10/2023 0.1  0.0 - 0.4 x10E3/uL Final    Baso (Absolute) 01/10/2023 0.1  0.0 - 0.2 x10E3/uL Final    Immature Granulocytes 01/10/2023 0  Not Estab.  % Final    Immature Grans (Abs) 01/10/2023 0.0  0.0 - 0.1 x10E3/uL Final    Glucose 01/10/2023 110 (H)  70 - 99 mg/dL Final    BUN 01/10/2023 17  8 - 27 mg/dL Final    Creatinine 01/10/2023 1.19  0.76 - 1.27 mg/dL Final    eGFR 01/10/2023 68  >59 mL/min/1.73 Final    BUN/Creatinine Ratio 01/10/2023 14  10 - 24 Final    Sodium 01/10/2023 143  134 - 144 mmol/L Final    Potassium 01/10/2023 4.9  3.5 - 5.2 mmol/L Final    Chloride 01/10/2023 105  96 - 106 mmol/L Final    CO2 01/10/2023 23  20 - 29 mmol/L Final    Calcium 01/10/2023 8.9  8.6 - 10.2 mg/dL Final    Protein, Total 01/10/2023 6.4  6.0 - 8.5 g/dL Final    Albumin 01/10/2023 4.0  3.8 - 4.8 g/dL Final    Globulin, Total 01/10/2023 2.4  1.5 - 4.5 g/dL Final    Albumin/Globulin Ratio 01/10/2023 1.7  1.2 - 2.2 Final    Total Bilirubin 01/10/2023 0.5  0.0 - 1.2 mg/dL Final    Alkaline Phosphatase 01/10/2023 83  44 - 121 IU/L Final    AST 01/10/2023 22  0 - 40 IU/L Final    ALT 01/10/2023 31  0 - 44 IU/L Final    Cholesterol 01/10/2023 184  100 - 199 mg/dL Final    Triglycerides 01/10/2023 200 (H)  0 - 149 mg/dL Final    HDL 01/10/2023 49  >39 mg/dL Final    VLDL Cholesterol Juliano 01/10/2023 34  5 - 40 mg/dL Final    LDL Calculated 01/10/2023 101 (H)  0 - 99 mg/dL Final    Specific Gravity, UA 01/10/2023 1.016  1.005 - 1.030 Final    pH, UA 01/10/2023 6.0  5.0 - 7.5 Final    Color, UA 01/10/2023 Yellow  Yellow Final    Clarity, UA 01/10/2023 Clear  Clear Final    Leukocytes, UA 01/10/2023 Negative  Negative Final    Protein, UA 01/10/2023 Negative  Negative/Trace Final    Glucose, UA 01/10/2023 Negative  Negative Final    Ketones, UA 01/10/2023 Negative  Negative Final    Occult Blood UA 01/10/2023 Negative  Negative Final    Bilirubin, UA 01/10/2023 Negative  Negative Final    Urobilinogen, UA 01/10/2023 0.2  0.2 - 1.0 mg/dL Final    Nitrite, UA 01/10/2023 Negative  Negative Final    Microscopic Examination 01/10/2023 Comment   Final    Microscopic Examination  01/10/2023 See below:   Final    Urinalysis Reflex 01/10/2023 Comment   Final    TSH 01/10/2023 2.790  0.450 - 4.500 uIU/mL Final    WBC, UA 01/10/2023 None seen  0 - 5 /hpf Final    RBC, UA 01/10/2023 None seen  0 - 2 /hpf Final    Epithelial Cells (non renal) 01/10/2023 None seen  0 - 10 /hpf Final    Casts 01/10/2023 None seen  None seen /lpf Final    Bacteria, UA 01/10/2023 None seen  None seen/Few Final       Past Medical History:   Diagnosis Date    Cancer     bladder CA s/p TURBT    GERD (gastroesophageal reflux disease)     Hematuria 2021    Hyperlipidemia      Past Surgical History:   Procedure Laterality Date    APPENDECTOMY      COLONOSCOPY      CYSTOGRAM  2021    CYSTOSCOPY N/A 05/06/2021    Procedure: CYSTOSCOPY;  Surgeon: Yao Duffy MD;  Location: [facility];  Service: Urology;  Laterality: N/A;    ESOPHAGOGASTRODUODENOSCOPY N/A 03/21/2022    Procedure: EGD (ESOPHAGOGASTRODUODENOSCOPY);  Surgeon: Saurav Hartley MD;  Location: [facility];  Service: Endoscopy;  Laterality: N/A;    LUMBAR FUSION  2015    ROTATOR CUFF REPAIR Left 11/2022    TONSILLECTOMY      UPPER GASTROINTESTINAL ENDOSCOPY       Family History   Problem Relation Age of Onset    Heart disease Mother     Cancer Mother 70        colon, breast    Diabetes Mother     Colon cancer Mother     COPD Father     Cancer Father 65        prostate    Cancer Brother 58        prostate CA    Heart disease Brother         CHF    Stomach cancer Neg Hx     Rectal cancer Neg Hx     Colon polyps Neg Hx     Crohn's disease Neg Hx     Ulcerative colitis Neg Hx        The 10-year CVD risk score (LENA'Agoino, et al., 2008) is: 17.2%    Values used to calculate the score:      Age: 62 years      Sex: Male      Diabetic: No      Tobacco smoker: No      Systolic Blood Pressure: 136 mmHg      Is BP treated: No      HDL Cholesterol: 49 mg/dL      Total Cholesterol: 184 mg/dL     Marital Status:   Alcohol History:  reports current alcohol use of about  6.0 standard drinks per week.  Tobacco History:  reports that he quit smoking about 29 years ago. His smoking use included cigarettes. He has a 40.00 pack-year smoking history. He has been exposed to tobacco smoke. He has never used smokeless tobacco.  Drug History:  reports no history of drug use.    Health Maintenance Topics with due status: Not Due       Topic Last Completion Date    Colorectal Cancer Screening 06/22/2020    TETANUS VACCINE 06/07/2021    Lipid Panel 01/10/2023     Immunization History   Administered Date(s) Administered    Influenza - Quadrivalent - PF *Preferred* (6 months and older) 01/11/2011, 12/10/2013    Influenza Split 12/10/2013    Tdap 06/07/2021    Zoster 05/06/2013    Zoster Recombinant 12/31/2019, 03/02/2020       Review of patient's allergies indicates:  No Known Allergies    Current Outpatient Medications:     ALPRAZolam (XANAX) 0.25 MG tablet, Take 1 tablet (0.25 mg total) by mouth 2 (two) times daily as needed for Anxiety., Disp: 20 tablet, Rfl: 1    atorvastatin (LIPITOR) 40 MG tablet, Take 1 tablet (40 mg total) by mouth every evening., Disp: 90 tablet, Rfl: 2    cetirizine (ZYRTEC) 10 MG tablet, Take 1 tablet (10 mg total) by mouth once daily., Disp: 90 tablet, Rfl: 3    losartan (COZAAR) 25 MG tablet, Take 1 tablet (25 mg total) by mouth once daily., Disp: 90 tablet, Rfl: 1    melatonin 10 mg Cap, Take by mouth., Disp: , Rfl:     meloxicam (MOBIC) 15 MG tablet, Take 1 tablet (15 mg total) by mouth daily as needed for Pain., Disp: 90 tablet, Rfl: 2    pantoprazole (PROTONIX) 20 MG tablet, TK 2 TS PO QD, Disp: , Rfl: 3    Current Facility-Administered Medications:     acetaminophen tablet 650 mg, 650 mg, Oral, Once PRN, Alirio Hunter MD    ondansetron disintegrating tablet 4 mg, 4 mg, Oral, Once PRN, Alirio Hunter MD    Review of Systems   Constitutional:  Positive for unexpected weight change (wt up 12lbs since Oct 2022). Negative for appetite change, chills and fever.  "  Eyes:  Negative for visual disturbance.   Respiratory:  Negative for cough, shortness of breath and wheezing.    Cardiovascular:  Negative for chest pain, palpitations and leg swelling.   Gastrointestinal:  Negative for abdominal pain, constipation and diarrhea.   Genitourinary:  Negative for dysuria, frequency and hematuria.   Skin:  Negative for rash.   Neurological:  Positive for headaches (mild intermittent). Negative for dizziness, syncope, speech difficulty and numbness.   Psychiatric/Behavioral:  Negative for dysphoric mood. The patient is not nervous/anxious.         Objective:      Vitals:    04/12/23 1610   BP: 136/88   Pulse: 75   SpO2: 96%   Weight: 109.7 kg (241 lb 12.8 oz)   Height: 6' 2" (1.88 m)     Physical Exam  Vitals and nursing note reviewed.   Constitutional:       General: He is not in acute distress.     Appearance: Normal appearance. He is well-developed.   HENT:      Head: Normocephalic and atraumatic.      Right Ear: Tympanic membrane and ear canal normal.      Left Ear: Tympanic membrane and ear canal normal.   Neck:      Vascular: No carotid bruit.   Cardiovascular:      Rate and Rhythm: Normal rate and regular rhythm.      Heart sounds: No murmur heard.    No friction rub. No gallop.   Pulmonary:      Effort: Pulmonary effort is normal. No respiratory distress.      Breath sounds: Normal breath sounds. No wheezing or rales.   Abdominal:      Palpations: Abdomen is soft.      Tenderness: There is no abdominal tenderness.   Musculoskeletal:      Cervical back: Neck supple.   Lymphadenopathy:      Cervical: No cervical adenopathy.   Skin:     General: Skin is warm and dry.      Findings: No rash.   Neurological:      General: No focal deficit present.      Mental Status: He is alert and oriented to person, place, and time.      Gait: Gait normal.         Assessment:       1. Primary hypertension    2. Dyslipidemia           Plan:       1. Primary hypertension  -BP slightly elevated " today, stage 1 HTN with higher readings over past 2 weeks at home. Recommend adding low dose losartan and continue to monitor BP at home. Call if BP consistently >140/90 and any problems after starting med- send in BP log in 2 weeks. Discussed imp of healthy diet, exercise and weight loss for BP control  -     losartan (COZAAR) 25 MG tablet; Take 1 tablet (25 mg total) by mouth once daily.  Dispense: 90 tablet; Refill: 1    2. Dyslipidemia   -mildly elevated TG on Jan labs,     Follow up if symptoms worsen or fail to improve, for as scheduled.            4/12/2023 Maliha Monson, NATALIE

## 2023-04-12 NOTE — PATIENT INSTRUCTIONS
Start losartan 25mg one tablet daily    Monitor blood pressure at home and call if blood pressure consistently above 140/90- send me blood pressure readings in 2 weeks via the portal

## 2023-07-06 DIAGNOSIS — M54.16 RADICULOPATHY, LUMBAR REGION: Primary | ICD-10-CM

## 2023-07-18 ENCOUNTER — HOSPITAL ENCOUNTER (OUTPATIENT)
Dept: RADIOLOGY | Facility: HOSPITAL | Age: 63
Discharge: HOME | End: 2023-07-18
Attending: NEUROLOGICAL SURGERY
Payer: COMMERCIAL

## 2023-07-18 DIAGNOSIS — M54.16 RADICULOPATHY, LUMBAR REGION: ICD-10-CM

## 2023-07-18 PROCEDURE — 72148 MRI LUMBAR SPINE W/O DYE: CPT | Mod: TC

## 2023-07-18 PROCEDURE — 72114 X-RAY EXAM L-S SPINE BENDING: CPT | Mod: TC

## 2023-11-08 ENCOUNTER — PATIENT MESSAGE (OUTPATIENT)
Dept: FAMILY MEDICINE | Facility: CLINIC | Age: 63
End: 2023-11-08

## 2023-11-08 DIAGNOSIS — Z00.00 ROUTINE GENERAL MEDICAL EXAMINATION AT A HEALTH CARE FACILITY: ICD-10-CM

## 2023-11-08 DIAGNOSIS — Z79.899 ENCOUNTER FOR LONG-TERM (CURRENT) USE OF OTHER MEDICATIONS: Primary | ICD-10-CM

## 2023-11-08 DIAGNOSIS — E78.5 HYPERLIPIDEMIA WITH TARGET LOW DENSITY LIPOPROTEIN (LDL) CHOLESTEROL LESS THAN 130 MG/DL: ICD-10-CM

## 2023-11-08 DIAGNOSIS — I10 PRIMARY HYPERTENSION: ICD-10-CM

## 2023-11-08 DIAGNOSIS — E78.5 DYSLIPIDEMIA: ICD-10-CM

## 2023-11-09 ENCOUNTER — PATIENT MESSAGE (OUTPATIENT)
Dept: FAMILY MEDICINE | Facility: CLINIC | Age: 63
End: 2023-11-09

## 2023-11-09 DIAGNOSIS — Z79.899 ENCOUNTER FOR LONG-TERM (CURRENT) USE OF OTHER MEDICATIONS: Primary | ICD-10-CM

## 2023-11-09 DIAGNOSIS — E78.5 HYPERLIPIDEMIA WITH TARGET LOW DENSITY LIPOPROTEIN (LDL) CHOLESTEROL LESS THAN 130 MG/DL: ICD-10-CM

## 2023-11-09 DIAGNOSIS — E78.5 DYSLIPIDEMIA: ICD-10-CM

## 2023-11-09 DIAGNOSIS — Z00.00 ROUTINE GENERAL MEDICAL EXAMINATION AT A HEALTH CARE FACILITY: ICD-10-CM

## 2023-11-09 DIAGNOSIS — I10 PRIMARY HYPERTENSION: ICD-10-CM

## 2023-11-10 ENCOUNTER — PATIENT MESSAGE (OUTPATIENT)
Dept: FAMILY MEDICINE | Facility: CLINIC | Age: 63
End: 2023-11-10

## 2023-11-10 NOTE — LETTER
[address]  [place] [address]  Phone: [phone]   [phone]                        MD Kofi Pina MD Chequita Williams, MD Matthew Bassett, PA-C Allison Hoffritz, NATALIE Echeverria NP      Date: 11/10/2023        Patient: Jose Singh  YOB: 1960      Please fax most recent PSA results.         Sincerely,     Lorraine Hensley LPN  Electronically Signed By: Maliha Monson NP

## 2023-11-15 DIAGNOSIS — E78.5 DYSLIPIDEMIA: ICD-10-CM

## 2023-11-15 RX ORDER — ATORVASTATIN CALCIUM 40 MG/1
40 TABLET, FILM COATED ORAL NIGHTLY
Qty: 90 TABLET | Refills: 3 | Status: SHIPPED | OUTPATIENT
Start: 2023-11-15

## 2023-11-15 NOTE — TELEPHONE ENCOUNTER
LOV:4/12/23 Iona    NOV: 11/30/23 Iona     Preffered Pharmacy:  [id number] - [place], [place] - [address] AT [facility] & [address] [id number]

## 2023-11-21 DIAGNOSIS — M51.36 DDD (DEGENERATIVE DISC DISEASE), LUMBAR: ICD-10-CM

## 2023-11-21 RX ORDER — MELOXICAM 15 MG/1
15 TABLET ORAL DAILY PRN
Qty: 90 TABLET | Refills: 2 | Status: SHIPPED | OUTPATIENT
Start: 2023-11-21

## 2023-11-21 NOTE — TELEPHONE ENCOUNTER
Pt is needing a refill on his meloxicam. Last office visit 04/12/2023. Next office visit 11/30/2023

## 2023-11-22 LAB
ALBUMIN SERPL-MCNC: 4.2 G/DL (ref 3.9–4.9)
ALBUMIN/GLOB SERPL: 1.9 {RATIO} (ref 1.2–2.2)
ALP SERPL-CCNC: 81 IU/L (ref 44–121)
ALT SERPL-CCNC: 31 IU/L (ref 0–44)
AST SERPL-CCNC: 27 IU/L (ref 0–40)
BASOPHILS # BLD AUTO: 0.1 X10E3/UL (ref 0–0.2)
BASOPHILS NFR BLD AUTO: 1 %
BILIRUB SERPL-MCNC: 0.5 MG/DL (ref 0–1.2)
BUN SERPL-MCNC: 20 MG/DL (ref 8–27)
BUN/CREAT SERPL: 17 (ref 10–24)
CALCIUM SERPL-MCNC: 9.1 MG/DL (ref 8.6–10.2)
CHLORIDE SERPL-SCNC: 104 MMOL/L (ref 96–106)
CHOLEST SERPL-MCNC: 175 MG/DL (ref 100–199)
CO2 SERPL-SCNC: 23 MMOL/L (ref 20–29)
CREAT SERPL-MCNC: 1.16 MG/DL (ref 0.76–1.27)
EOSINOPHIL # BLD AUTO: 0.3 X10E3/UL (ref 0–0.4)
EOSINOPHIL NFR BLD AUTO: 3 %
ERYTHROCYTE [DISTWIDTH] IN BLOOD BY AUTOMATED COUNT: 12.9 % (ref 11.6–15.4)
EST. GFR  (NO RACE VARIABLE): 70 ML/MIN/1.73
GLOBULIN SER CALC-MCNC: 2.2 G/DL (ref 1.5–4.5)
GLUCOSE SERPL-MCNC: 104 MG/DL (ref 70–99)
HCT VFR BLD AUTO: 44.8 % (ref 37.5–51)
HDLC SERPL-MCNC: 58 MG/DL
HGB BLD-MCNC: 15.5 G/DL (ref 13–17.7)
IMM GRANULOCYTES # BLD AUTO: 0 X10E3/UL (ref 0–0.1)
IMM GRANULOCYTES NFR BLD AUTO: 0 %
LDLC SERPL CALC-MCNC: 94 MG/DL (ref 0–99)
LYMPHOCYTES # BLD AUTO: 1.6 X10E3/UL (ref 0.7–3.1)
LYMPHOCYTES NFR BLD AUTO: 21 %
MCH RBC QN AUTO: 32 PG (ref 26.6–33)
MCHC RBC AUTO-ENTMCNC: 34.6 G/DL (ref 31.5–35.7)
MCV RBC AUTO: 92 FL (ref 79–97)
MONOCYTES # BLD AUTO: 0.6 X10E3/UL (ref 0.1–0.9)
MONOCYTES NFR BLD AUTO: 8 %
NEUTROPHILS # BLD AUTO: 5.2 X10E3/UL (ref 1.4–7)
NEUTROPHILS NFR BLD AUTO: 67 %
PLATELET # BLD AUTO: 275 X10E3/UL (ref 150–450)
POTASSIUM SERPL-SCNC: 4.6 MMOL/L (ref 3.5–5.2)
PROT SERPL-MCNC: 6.4 G/DL (ref 6–8.5)
RBC # BLD AUTO: 4.85 X10E6/UL (ref 4.14–5.8)
SODIUM SERPL-SCNC: 139 MMOL/L (ref 134–144)
TRIGL SERPL-MCNC: 133 MG/DL (ref 0–149)
TSH SERPL DL<=0.005 MIU/L-ACNC: 2.57 UIU/ML (ref 0.45–4.5)
VLDLC SERPL CALC-MCNC: 23 MG/DL (ref 5–40)
WBC # BLD AUTO: 7.8 X10E3/UL (ref 3.4–10.8)

## 2023-11-30 ENCOUNTER — OFFICE VISIT (OUTPATIENT)
Dept: FAMILY MEDICINE | Facility: CLINIC | Age: 63
End: 2023-11-30
Payer: COMMERCIAL

## 2023-11-30 VITALS
OXYGEN SATURATION: 98 % | BODY MASS INDEX: 30.29 KG/M2 | DIASTOLIC BLOOD PRESSURE: 82 MMHG | WEIGHT: 236 LBS | SYSTOLIC BLOOD PRESSURE: 132 MMHG | HEIGHT: 74 IN | HEART RATE: 65 BPM

## 2023-11-30 DIAGNOSIS — M51.36 DDD (DEGENERATIVE DISC DISEASE), LUMBAR: ICD-10-CM

## 2023-11-30 DIAGNOSIS — I10 PRIMARY HYPERTENSION: Primary | ICD-10-CM

## 2023-11-30 DIAGNOSIS — E78.5 DYSLIPIDEMIA: ICD-10-CM

## 2023-11-30 DIAGNOSIS — F43.21 GRIEF: ICD-10-CM

## 2023-11-30 PROCEDURE — 4010F ACE/ARB THERAPY RXD/TAKEN: CPT | Mod: CPTII,S$GLB,, | Performed by: NURSE PRACTITIONER

## 2023-11-30 PROCEDURE — 1159F MED LIST DOCD IN RCRD: CPT | Mod: CPTII,S$GLB,, | Performed by: NURSE PRACTITIONER

## 2023-11-30 PROCEDURE — 3079F PR MOST RECENT DIASTOLIC BLOOD PRESSURE 80-89 MM HG: ICD-10-PCS | Mod: CPTII,S$GLB,, | Performed by: NURSE PRACTITIONER

## 2023-11-30 PROCEDURE — 1101F PR PT FALLS ASSESS DOC 0-1 FALLS W/OUT INJ PAST YR: ICD-10-PCS | Mod: CPTII,S$GLB,, | Performed by: NURSE PRACTITIONER

## 2023-11-30 PROCEDURE — 99214 PR OFFICE/OUTPT VISIT, EST, LEVL IV, 30-39 MIN: ICD-10-PCS | Mod: S$GLB,,, | Performed by: NURSE PRACTITIONER

## 2023-11-30 PROCEDURE — 3288F FALL RISK ASSESSMENT DOCD: CPT | Mod: CPTII,S$GLB,, | Performed by: NURSE PRACTITIONER

## 2023-11-30 PROCEDURE — 4010F PR ACE/ARB THEARPY RXD/TAKEN: ICD-10-PCS | Mod: CPTII,S$GLB,, | Performed by: NURSE PRACTITIONER

## 2023-11-30 PROCEDURE — 3075F PR MOST RECENT SYSTOLIC BLOOD PRESS GE 130-139MM HG: ICD-10-PCS | Mod: CPTII,S$GLB,, | Performed by: NURSE PRACTITIONER

## 2023-11-30 PROCEDURE — 3008F PR BODY MASS INDEX (BMI) DOCUMENTED: ICD-10-PCS | Mod: CPTII,S$GLB,, | Performed by: NURSE PRACTITIONER

## 2023-11-30 PROCEDURE — 1160F PR REVIEW ALL MEDS BY PRESCRIBER/CLIN PHARMACIST DOCUMENTED: ICD-10-PCS | Mod: CPTII,S$GLB,, | Performed by: NURSE PRACTITIONER

## 2023-11-30 PROCEDURE — 1160F RVW MEDS BY RX/DR IN RCRD: CPT | Mod: CPTII,S$GLB,, | Performed by: NURSE PRACTITIONER

## 2023-11-30 PROCEDURE — 3079F DIAST BP 80-89 MM HG: CPT | Mod: CPTII,S$GLB,, | Performed by: NURSE PRACTITIONER

## 2023-11-30 PROCEDURE — 1101F PT FALLS ASSESS-DOCD LE1/YR: CPT | Mod: CPTII,S$GLB,, | Performed by: NURSE PRACTITIONER

## 2023-11-30 PROCEDURE — 99214 OFFICE O/P EST MOD 30 MIN: CPT | Mod: S$GLB,,, | Performed by: NURSE PRACTITIONER

## 2023-11-30 PROCEDURE — 3008F BODY MASS INDEX DOCD: CPT | Mod: CPTII,S$GLB,, | Performed by: NURSE PRACTITIONER

## 2023-11-30 PROCEDURE — 3288F PR FALLS RISK ASSESSMENT DOCUMENTED: ICD-10-PCS | Mod: CPTII,S$GLB,, | Performed by: NURSE PRACTITIONER

## 2023-11-30 PROCEDURE — 1159F PR MEDICATION LIST DOCUMENTED IN MEDICAL RECORD: ICD-10-PCS | Mod: CPTII,S$GLB,, | Performed by: NURSE PRACTITIONER

## 2023-11-30 PROCEDURE — 3075F SYST BP GE 130 - 139MM HG: CPT | Mod: CPTII,S$GLB,, | Performed by: NURSE PRACTITIONER

## 2023-11-30 NOTE — PROGRESS NOTES
"  SUBJECTIVE:    Patient ID: Jose Singh is a 63 y.o. male.    Chief Complaint: Follow-up (No bottles//Pt is here for a check up//Decline flu vaccine//IAN )    Pt here for regular f/u- lipids, HTN    Pt reports his wife was diagnosed with cancer in January and passed away in June. Admits it's been hard and he's grieving but overall feels he's handling it okay. Stays busy with workReports during her illness and right after her death he was having palpitations so he went and saw Dr. Wise- had echo and stress test which were negative. reports palpitations have now improved. Pt reports stopped losartan several months ago, BP was running okay and since "his heart checked out" didn't think he needed it    Reports was having some sciatica pain again with hx of lumbar DDD s/p lumbar fusion- went back and saw Dr. John, NS and referred to PT- reports 2 months of PT didn't seem to help at all then went to chiropractor. Reports has continued with pain to lower back radiating into buttocks, pain mainly with sitting. States he needs to call and schedule f/u with Dr. John    Saw Dr. Myles, ortho for bilat hip pain- had bursa and joint injections which helped for a few weeks- has to f/u with Dr. Myles and he has mentioned MRI            Patient Message on 11/09/2023   Component Date Value Ref Range Status    WBC 11/21/2023 7.8  3.4 - 10.8 x10E3/uL Final    RBC 11/21/2023 4.85  4.14 - 5.80 x10E6/uL Final    Hemoglobin 11/21/2023 15.5  13.0 - 17.7 g/dL Final    Hematocrit 11/21/2023 44.8  37.5 - 51.0 % Final    MCV 11/21/2023 92  79 - 97 fL Final    MCH 11/21/2023 32.0  26.6 - 33.0 pg Final    MCHC 11/21/2023 34.6  31.5 - 35.7 g/dL Final    RDW 11/21/2023 12.9  11.6 - 15.4 % Final    Platelets 11/21/2023 275  150 - 450 x10E3/uL Final    Neutrophils 11/21/2023 67  Not Estab. % Final    Lymphs 11/21/2023 21  Not Estab. % Final    Monocytes 11/21/2023 8  Not Estab. % Final    Eos 11/21/2023 3  Not Estab. % Final    Basos " 11/21/2023 1  Not Estab. % Final    Neutrophils (Absolute) 11/21/2023 5.2  1.4 - 7.0 x10E3/uL Final    Lymphs (Absolute) 11/21/2023 1.6  0.7 - 3.1 x10E3/uL Final    Monocytes(Absolute) 11/21/2023 0.6  0.1 - 0.9 x10E3/uL Final    Eos (Absolute) 11/21/2023 0.3  0.0 - 0.4 x10E3/uL Final    Baso (Absolute) 11/21/2023 0.1  0.0 - 0.2 x10E3/uL Final    Immature Granulocytes 11/21/2023 0  Not Estab. % Final    Immature Grans (Abs) 11/21/2023 0.0  0.0 - 0.1 x10E3/uL Final    Glucose 11/21/2023 104 (H)  70 - 99 mg/dL Final    BUN 11/21/2023 20  8 - 27 mg/dL Final    Creatinine 11/21/2023 1.16  0.76 - 1.27 mg/dL Final    eGFR 11/21/2023 70  >59 mL/min/1.73 Final    BUN/Creatinine Ratio 11/21/2023 17  10 - 24 Final    Sodium 11/21/2023 139  134 - 144 mmol/L Final    Potassium 11/21/2023 4.6  3.5 - 5.2 mmol/L Final    Chloride 11/21/2023 104  96 - 106 mmol/L Final    CO2 11/21/2023 23  20 - 29 mmol/L Final    Calcium 11/21/2023 9.1  8.6 - 10.2 mg/dL Final    Protein, Total 11/21/2023 6.4  6.0 - 8.5 g/dL Final    Albumin 11/21/2023 4.2  3.9 - 4.9 g/dL Final    Globulin, Total 11/21/2023 2.2  1.5 - 4.5 g/dL Final    Albumin/Globulin Ratio 11/21/2023 1.9  1.2 - 2.2 Final    Total Bilirubin 11/21/2023 0.5  0.0 - 1.2 mg/dL Final    Alkaline Phosphatase 11/21/2023 81  44 - 121 IU/L Final    AST 11/21/2023 27  0 - 40 IU/L Final    ALT 11/21/2023 31  0 - 44 IU/L Final    Cholesterol 11/21/2023 175  100 - 199 mg/dL Final    Triglycerides 11/21/2023 133  0 - 149 mg/dL Final    HDL 11/21/2023 58  >39 mg/dL Final    VLDL Cholesterol Juliano 11/21/2023 23  5 - 40 mg/dL Final    LDL Calculated 11/21/2023 94  0 - 99 mg/dL Final    TSH 11/21/2023 2.570  0.450 - 4.500 uIU/mL Final       Past Medical History:   Diagnosis Date    Cancer     bladder CA s/p TURBT    GERD (gastroesophageal reflux disease)     Hematuria 2021    Hyperlipidemia      Past Surgical History:   Procedure Laterality Date    APPENDECTOMY      COLONOSCOPY      CYSTOGRAM  2021     CYSTOSCOPY N/A 05/06/2021    Procedure: CYSTOSCOPY;  Surgeon: Yao Duffy MD;  [facility]: [facility];  Service: Urology;  Laterality: N/A;    ESOPHAGOGASTRODUODENOSCOPY N/A 03/21/2022    Procedure: EGD (ESOPHAGOGASTRODUODENOSCOPY);  Surgeon: Saurav Hartley MD;  Location: [facility];  Service: Endoscopy;  Laterality: N/A;    LUMBAR FUSION  2015    ROTATOR CUFF REPAIR Left 11/2022    TONSILLECTOMY      UPPER GASTROINTESTINAL ENDOSCOPY       Family History   Problem Relation Age of Onset    Heart disease Mother     Cancer Mother 70        colon, breast    Diabetes Mother     Colon cancer Mother     COPD Father     Cancer Father 65        prostate    Cancer Brother 58        prostate CA    Heart disease Brother         CHF    Stomach cancer Neg Hx     Rectal cancer Neg Hx     Colon polyps Neg Hx     Crohn's disease Neg Hx     Ulcerative colitis Neg Hx        All of your core healthy metrics are met.      The 10-year CVD risk score (LENA'Gabeino, et al., 2008) is: 18.8%    Values used to calculate the score:      Age: 63 years      Sex: Male      Diabetic: No      Tobacco smoker: No      Systolic Blood Pressure: 132 mmHg      Is BP treated: Yes      HDL Cholesterol: 58 mg/dL      Total Cholesterol: 175 mg/dL     Marital Status:   Alcohol History:  reports current alcohol use of about 6.0 standard drinks of alcohol per week.  Tobacco History:  reports that he quit smoking about 30 years ago. His smoking use included cigarettes. He started smoking about 50 years ago. He has a 40.0 pack-year smoking history. He has been exposed to tobacco smoke. He has never used smokeless tobacco.  Drug History:  reports no history of drug use.    Health Maintenance Topics with due status: Not Due       Topic Last Completion Date    Colorectal Cancer Screening 06/22/2020    TETANUS VACCINE 06/07/2021    Lipid Panel 11/21/2023     Immunization History   Administered Date(s) Administered    Influenza - Quadrivalent - PF  *Preferred* (6 months and older) 01/11/2011, 12/10/2013    Influenza Split 12/10/2013    Tdap 06/07/2021    Zoster 05/06/2013    Zoster Recombinant 12/31/2019, 03/02/2020       Review of patient's allergies indicates:  No Known Allergies    Current Outpatient Medications:     atorvastatin (LIPITOR) 40 MG tablet, Take 1 tablet (40 mg total) by mouth every evening., Disp: 90 tablet, Rfl: 3    meloxicam (MOBIC) 15 MG tablet, Take 1 tablet (15 mg total) by mouth daily as needed for Pain., Disp: 90 tablet, Rfl: 2    pantoprazole (PROTONIX) 20 MG tablet, 20 mg., Disp: , Rfl: 3    ALPRAZolam (XANAX) 0.25 MG tablet, Take 1 tablet (0.25 mg total) by mouth 2 (two) times daily as needed for Anxiety., Disp: 20 tablet, Rfl: 1    cetirizine (ZYRTEC) 10 MG tablet, Take 1 tablet (10 mg total) by mouth once daily. (Patient not taking: Reported on 11/30/2023), Disp: 90 tablet, Rfl: 3    losartan (COZAAR) 25 MG tablet, Take 1 tablet (25 mg total) by mouth once daily. (Patient not taking: Reported on 11/30/2023), Disp: 90 tablet, Rfl: 1    melatonin 10 mg Cap, Take by mouth., Disp: , Rfl:     Current Facility-Administered Medications:     acetaminophen tablet 650 mg, 650 mg, Oral, Once PRN, Alirio Hunter MD    ondansetron disintegrating tablet 4 mg, 4 mg, Oral, Once PRN, Alirio Hunter MD    Review of Systems   Constitutional:  Negative for appetite change, chills, fever and unexpected weight change.   HENT:  Negative for sore throat and trouble swallowing.    Eyes:  Negative for visual disturbance.   Respiratory:  Negative for cough, shortness of breath and wheezing.    Cardiovascular:  Negative for chest pain, palpitations and leg swelling.   Gastrointestinal:  Negative for abdominal pain, constipation, diarrhea, nausea and vomiting.   Genitourinary:  Negative for dysuria, frequency and hematuria.   Musculoskeletal:  Positive for back pain. Negative for gait problem.   Skin:  Negative for rash.   Neurological:  Negative for  "dizziness, syncope, speech difficulty, numbness and headaches.   Psychiatric/Behavioral:  Positive for dysphoric mood (grieving). Negative for self-injury, sleep disturbance and suicidal ideas. The patient is not nervous/anxious.           Objective:      Vitals:    11/30/23 0922 11/30/23 1008   BP: 138/88 132/82   Pulse: 65    SpO2: 98%    Weight: 107 kg (236 lb)    Height: 6' 2" (1.88 m)      Physical Exam  Vitals and nursing note reviewed.   Constitutional:       General: He is not in acute distress.     Appearance: Normal appearance. He is well-developed.   HENT:      Head: Normocephalic and atraumatic.      Right Ear: Tympanic membrane and ear canal normal.      Left Ear: Tympanic membrane and ear canal normal.   Neck:      Vascular: No carotid bruit.   Cardiovascular:      Rate and Rhythm: Normal rate and regular rhythm.      Heart sounds: No murmur heard.     No friction rub. No gallop.   Pulmonary:      Effort: Pulmonary effort is normal. No respiratory distress.      Breath sounds: Normal breath sounds. No wheezing or rales.   Abdominal:      Palpations: Abdomen is soft.      Tenderness: There is no abdominal tenderness.   Musculoskeletal:      Cervical back: Neck supple.   Lymphadenopathy:      Cervical: No cervical adenopathy.   Skin:     General: Skin is warm and dry.      Findings: No rash.   Neurological:      General: No focal deficit present.      Mental Status: He is alert and oriented to person, place, and time.      Gait: Gait normal.   Psychiatric:         Mood and Affect: Mood normal.         Behavior: Behavior normal. Behavior is cooperative.         Thought Content: Thought content normal.         Cognition and Memory: Cognition normal.      Comments: Tearful when talking about his wife           Assessment:       1. Primary hypertension    2. Dyslipidemia    3. Grief    4. DDD (degenerative disc disease), lumbar           Plan:       1. Primary hypertension   -BP stable off losartan, " encouraged to monitor occasionally    2. Dyslipidemia   -reviewed recent labs with pt, well controlled    3. Grief   -pt grieving his wife's death though denies depression. Appetite is good, staying active with work and friends/family. Discussed grief support groups which he declines    4. DDD (degenerative disc disease), lumbar   -reports continues with back/buttock pain despite PT, to schedule f/u with Dr. John    Follow up in about 1 year (around 11/30/2024), or if symptoms worsen or fail to improve, for annual visit. Pt requests 1 year f/u          Counseled on age and gender appropriate medical preventative services, including cancer screenings, immunizations, overall nutritional health, need for a consistent exercise regimen and an overall push towards maintaining a vigorous and active lifestyle.      11/30/2023 Maliha Monson NP

## 2024-01-21 ENCOUNTER — HOSPITAL ENCOUNTER (OUTPATIENT)
Dept: RADIOLOGY | Facility: HOSPITAL | Age: 64
Discharge: HOME | End: 2024-01-21
Attending: SPECIALIST
Payer: COMMERCIAL

## 2024-01-21 DIAGNOSIS — R97.20 ELEVATED PROSTATE SPECIFIC ANTIGEN (PSA): ICD-10-CM

## 2024-01-21 PROCEDURE — 72197 MRI PELVIS W/O & W/DYE: CPT | Mod: TC

## 2024-01-21 PROCEDURE — 25500020 PHARM REV CODE 255

## 2024-01-21 PROCEDURE — 72197 MRI PELVIS W/O & W/DYE: CPT | Mod: 26,,, | Performed by: RADIOLOGY

## 2024-01-21 PROCEDURE — A9585 GADOBUTROL INJECTION: HCPCS

## 2024-01-21 RX ORDER — GADOBUTROL 604.72 MG/ML
INJECTION INTRAVENOUS
Status: COMPLETED
Start: 2024-01-21 | End: 2024-01-21

## 2024-01-21 RX ADMIN — GADOBUTROL 10 ML: 604.72 INJECTION INTRAVENOUS at 01:01

## 2024-04-03 ENCOUNTER — OFFICE VISIT (OUTPATIENT)
Dept: ORTHOPEDICS | Facility: CLINIC | Age: 64
End: 2024-04-03
Payer: COMMERCIAL

## 2024-04-03 VITALS — WEIGHT: 230 LBS | BODY MASS INDEX: 29.53 KG/M2

## 2024-04-03 DIAGNOSIS — M18.11 ARTHRITIS OF CARPOMETACARPAL (CMC) JOINT OF RIGHT THUMB: ICD-10-CM

## 2024-04-03 DIAGNOSIS — M18.12 ARTHRITIS OF CARPOMETACARPAL (CMC) JOINT OF LEFT THUMB: Primary | ICD-10-CM

## 2024-04-03 PROCEDURE — 1125F AMNT PAIN NOTED PAIN PRSNT: CPT | Mod: CPTII,S$GLB,, | Performed by: PHYSICIAN ASSISTANT

## 2024-04-03 PROCEDURE — 3008F BODY MASS INDEX DOCD: CPT | Mod: CPTII,S$GLB,, | Performed by: PHYSICIAN ASSISTANT

## 2024-04-03 PROCEDURE — 3288F FALL RISK ASSESSMENT DOCD: CPT | Mod: CPTII,S$GLB,, | Performed by: PHYSICIAN ASSISTANT

## 2024-04-03 PROCEDURE — 20600 DRAIN/INJ JOINT/BURSA W/O US: CPT | Mod: 50,S$GLB,, | Performed by: PHYSICIAN ASSISTANT

## 2024-04-03 PROCEDURE — 99203 OFFICE O/P NEW LOW 30 MIN: CPT | Mod: 25,S$GLB,, | Performed by: PHYSICIAN ASSISTANT

## 2024-04-03 PROCEDURE — 1101F PT FALLS ASSESS-DOCD LE1/YR: CPT | Mod: CPTII,S$GLB,, | Performed by: PHYSICIAN ASSISTANT

## 2024-04-03 PROCEDURE — 1159F MED LIST DOCD IN RCRD: CPT | Mod: CPTII,S$GLB,, | Performed by: PHYSICIAN ASSISTANT

## 2024-04-03 RX ORDER — TRIAMCINOLONE ACETONIDE 40 MG/ML
40 INJECTION, SUSPENSION INTRA-ARTICULAR; INTRAMUSCULAR
Status: DISCONTINUED | OUTPATIENT
Start: 2024-04-03 | End: 2024-04-03 | Stop reason: HOSPADM

## 2024-04-03 RX ORDER — ALBUTEROL SULFATE 90 UG/1
AEROSOL, METERED RESPIRATORY (INHALATION)
COMMUNITY
Start: 2023-12-15

## 2024-04-03 RX ADMIN — TRIAMCINOLONE ACETONIDE 40 MG: 40 INJECTION, SUSPENSION INTRA-ARTICULAR; INTRAMUSCULAR at 01:04

## 2024-04-03 NOTE — PROGRESS NOTES
[facility]  [address]  [place] [address]  Phone: [phone]   [phone]    Patient's PCP: Maliha Monson NP  Referring Provider: No ref. provider found    Subjective:      Chief Complaint:   Chief Complaint   Patient presents with    Hand Pain     Saw dr leavitt about 8 months ago for pain in bilateral thumbs ; He received injections in bilateral thumbs which seemed to help now hurting again        Past Medical History:   Diagnosis Date    Cancer     bladder CA s/p TURBT    GERD (gastroesophageal reflux disease)     Hematuria 2021    Hyperlipidemia        Past Surgical History:   Procedure Laterality Date    APPENDECTOMY      COLONOSCOPY      CYSTOGRAM  2021    CYSTOSCOPY N/A 05/06/2021    Procedure: CYSTOSCOPY;  Surgeon: Yao Duffy MD;  Location: [facility];  Service: Urology;  Laterality: N/A;    ESOPHAGOGASTRODUODENOSCOPY N/A 03/21/2022    Procedure: EGD (ESOPHAGOGASTRODUODENOSCOPY);  Surgeon: Saurav Hartley MD;  Location: [facility];  Service: Endoscopy;  Laterality: N/A;    LUMBAR FUSION  2015    ROTATOR CUFF REPAIR Left 11/2022    TONSILLECTOMY      UPPER GASTROINTESTINAL ENDOSCOPY         Current Outpatient Medications   Medication Sig    atorvastatin (LIPITOR) 40 MG tablet Take 1 tablet (40 mg total) by mouth every evening.    cetirizine (ZYRTEC) 10 MG tablet Take 1 tablet (10 mg total) by mouth once daily.    losartan (COZAAR) 25 MG tablet Take 1 tablet (25 mg total) by mouth once daily.    melatonin 10 mg Cap Take by mouth.    meloxicam (MOBIC) 15 MG tablet Take 1 tablet (15 mg total) by mouth daily as needed for Pain.    pantoprazole (PROTONIX) 20 MG tablet 20 mg.    albuterol (PROVENTIL/VENTOLIN HFA) 90 mcg/actuation inhaler INHALE 1 TO 2 PUFFS INTO THE LUNGS EVERY 4 TO 6 HOURS AS NEEDED COUGHING OR WHEEZING    ALPRAZolam (XANAX) 0.25 MG tablet Take 1 tablet (0.25 mg total) by mouth 2 (two) times daily as needed for Anxiety.     Current  Facility-Administered Medications   Medication    acetaminophen tablet 650 mg    ondansetron disintegrating tablet 4 mg       Review of patient's allergies indicates:  No Known Allergies    Family History   Problem Relation Age of Onset    Heart disease Mother     Cancer Mother 70        colon, breast    Diabetes Mother     Colon cancer Mother     COPD Father     Cancer Father 65        prostate    Cancer Brother 58        prostate CA    Heart disease Brother         CHF    Stomach cancer Neg Hx     Rectal cancer Neg Hx     Colon polyps Neg Hx     Crohn's disease Neg Hx     Ulcerative colitis Neg Hx        Social History     Socioeconomic History    Marital status:    Occupational History     Employer: [employer]   Tobacco Use    Smoking status: Former     Current packs/day: 0.00     Average packs/day: 2.0 packs/day for 20.0 years (40.0 ttl pk-yrs)     Types: Cigarettes     Start date: 1973     Quit date: 1993     Years since quittin.9     Passive exposure: Past    Smokeless tobacco: Never   Substance and Sexual Activity    Alcohol use: Yes     Alcohol/week: 6.0 standard drinks of alcohol     Types: 6 Cans of beer per week     Comment: SOCIALLY     Drug use: No    Sexual activity: Yes       Prior to meeting with the patient I reviewed the medical chart in [facility]. This included reviewing the previous progress notes from our office, review of the patient's last appointment with their primary care provider, review of any visits to the emergency room, and review of any pain management appointments or procedures.    History of present illness:  63-year-old male, presents to clinic today for evaluation of complaints of bilateral thumb pain.  He has been having thumb pain now for little bit over a year.  He thinks about 8 months or so ago he saw another orthopedist, Dr. Myles who injected both of his thumbs and told him that he had arthritis.  He is managed well with the pain it did start to come back  about 2 or 3 months ago.      Review of Systems:    Constitutional: Negative for chills, fever and weight loss.   HENT: Negative for congestion.    Eyes: Negative for discharge and redness.   Respiratory: Negative for cough and shortness of breath.    Cardiovascular: Negative for chest pain.   Gastrointestinal: Negative for nausea and vomiting.   Musculoskeletal: See HPI.   Skin: Negative for rash.   Neurological: Negative for headaches.   Endo/Heme/Allergies: Does not bruise/bleed easily.   Psychiatric/Behavioral: The patient is not nervous/anxious.    All other systems reviewed and are negative.       Objective:      Physical Examination:    Vital Signs:  There were no vitals filed for this visit.    Body mass index is 29.53 kg/m².    This a well-developed, well nourished patient in no acute distress.  They are alert and oriented and cooperative to examination.       Examination of the bilateral hands, skin is dry and intact, no erythema or ecchymosis, no signs symptoms of infection.  Range of motion is full throughout the wrist hand and fingers.  He can make a fist, he can oppose the thumb in the little finger.  Cap refills less than 3 seconds.  Distally neurovascularly intact.  He is got pain at the basilar joint, he gets pain and crepitus with CMC grind testing.      Pertinent New Results:        XRAY Report / Interpretation:     AP lateral oblique views of the bilateral hands taken today in the office demonstrate advanced basilar arthrosis with bone-on-bone appearance of the  Carpometacarpal joint bilaterally.          Assessment:       1. Arthritis of carpometacarpal (CMC) joint of left thumb    2. Arthritis of carpometacarpal (CMC) joint of right thumb      Plan:     Arthritis of carpometacarpal (CMC) joint of left thumb  -     X-Ray Hand 3 View Bilateral  -     Small Joint Aspiration/Injection: L thumb CMC    Arthritis of carpometacarpal (CMC) joint of right thumb  -     X-Ray Hand 3 View Bilateral  -      Small Joint Aspiration/Injection: R thumb CMC        Follow up for 2-3 months Bilat CMC inj 4.3.24 , Tues/Thurs with Dr. Siddiqui.      Bilateral thumb pain, advanced basilar arthritis, we discussed CMC or interpositional arthroplasty in great detail.  Got modest relief in his symptoms with the last injections done by Dr. Myles about 8 months ago.  We repeated the injections today, lidocaine and triamcinolone.  He will follow up with us in 2 months.  If he is doing well he can simply cancel.      Tay Francisco, GURJITS, PA-C    This note was created using [employer] voice recognition software that occasionally misinterprets words or phrases.

## 2024-04-03 NOTE — PROCEDURES
Small Joint Aspiration/Injection: R thumb CMC    Date/Time: 4/3/2024 1:30 PM    Performed by: Tay Francisco PA  Authorized by: Tay Francisco PA    Consent Done?:  Yes (Verbal)  Indications:  Pain and arthritis  Site marked: the procedure site was marked    Timeout: prior to procedure the correct patient, procedure, and site was verified    Prep: patient was prepped and draped in usual sterile fashion      Local anesthesia used?: Yes    Local anesthetic:  Lidocaine 1% without epinephrine  Location:  Thumb  Site:  R thumb CMC  Ultrasonic guidance for needle placement?: No    Needle size:  25 G  Medications:  40 mg triamcinolone acetonide 40 mg/mL  Patient tolerance:  Patient tolerated the procedure well with no immediate complications  Small Joint Aspiration/Injection: L thumb CMC    Date/Time: 4/3/2024 1:30 PM    Performed by: Tay Francisco PA  Authorized by: Tay Francisco PA    Consent Done?:  Yes (Verbal)  Indications:  Arthritis and pain  Site marked: the procedure site was marked    Timeout: prior to procedure the correct patient, procedure, and site was verified    Prep: patient was prepped and draped in usual sterile fashion      Local anesthesia used?: Yes    Local anesthetic:  Lidocaine 1% without epinephrine  Location:  Thumb  Site:  L thumb CMC  Ultrasonic guidance for needle placement?: No    Needle size:  25 G  Medications:  40 mg triamcinolone acetonide 40 mg/mL  Patient tolerance:  Patient tolerated the procedure well with no immediate complications

## 2024-04-29 ENCOUNTER — PATIENT MESSAGE (OUTPATIENT)
Dept: FAMILY MEDICINE | Facility: CLINIC | Age: 64
End: 2024-04-29
Payer: COMMERCIAL

## 2024-04-29 DIAGNOSIS — M51.36 DDD (DEGENERATIVE DISC DISEASE), LUMBAR: ICD-10-CM

## 2024-04-30 RX ORDER — MELOXICAM 15 MG/1
15 TABLET ORAL DAILY PRN
Qty: 90 TABLET | Refills: 2 | Status: SHIPPED | OUTPATIENT
Start: 2024-04-30 | End: 2024-06-11 | Stop reason: SDUPTHER

## 2024-05-02 ENCOUNTER — TELEPHONE (OUTPATIENT)
Dept: FAMILY MEDICINE | Facility: CLINIC | Age: 64
End: 2024-05-02
Payer: COMMERCIAL

## 2024-05-02 NOTE — TELEPHONE ENCOUNTER
Spoke to kenny benavides and she stated they only has 30 and he can fill the 60 when it comes in     Spoke to pt and let him know it will be fill for the 60 when the supply comes in. Pt verbalized understanding

## 2024-05-02 NOTE — TELEPHONE ENCOUNTER
----- Message from Mireille Soler sent at 5/2/2024 10:31 AM CDT -----  He wanted to let you know [facility] on [address] said they can only fill # 30 of Melxocam. Pt's # [phone]

## 2024-06-10 DIAGNOSIS — E78.5 DYSLIPIDEMIA: ICD-10-CM

## 2024-06-10 DIAGNOSIS — M51.36 DDD (DEGENERATIVE DISC DISEASE), LUMBAR: ICD-10-CM

## 2024-06-11 RX ORDER — MELOXICAM 15 MG/1
15 TABLET ORAL DAILY PRN
Qty: 90 TABLET | Refills: 3 | Status: SHIPPED | OUTPATIENT
Start: 2024-06-11

## 2024-06-11 RX ORDER — ATORVASTATIN CALCIUM 40 MG/1
40 TABLET, FILM COATED ORAL NIGHTLY
Qty: 90 TABLET | Refills: 3 | Status: SHIPPED | OUTPATIENT
Start: 2024-06-11

## 2024-06-20 ENCOUNTER — TELEPHONE (OUTPATIENT)
Dept: FAMILY MEDICINE | Facility: CLINIC | Age: 64
End: 2024-06-20
Payer: COMMERCIAL

## 2024-06-20 DIAGNOSIS — E78.5 DYSLIPIDEMIA: ICD-10-CM

## 2024-06-20 DIAGNOSIS — Z12.5 SPECIAL SCREENING FOR MALIGNANT NEOPLASM OF PROSTATE: ICD-10-CM

## 2024-06-20 DIAGNOSIS — Z00.00 ROUTINE GENERAL MEDICAL EXAMINATION AT A HEALTH CARE FACILITY: ICD-10-CM

## 2024-06-20 DIAGNOSIS — I10 PRIMARY HYPERTENSION: Primary | ICD-10-CM

## 2024-06-20 NOTE — TELEPHONE ENCOUNTER
----- Message from Mireille Soler sent at 6/20/2024 12:48 PM CDT -----  The patient has an Annual appointment on 12/03/2024. Please put his lab orders in. He goes to [facility]. He will go about 1 week before his appointment. Pt's # [phone]

## 2024-08-08 ENCOUNTER — TELEPHONE (OUTPATIENT)
Dept: ORTHOPEDICS | Facility: CLINIC | Age: 64
End: 2024-08-08
Payer: COMMERCIAL

## 2024-08-12 ENCOUNTER — OFFICE VISIT (OUTPATIENT)
Dept: ORTHOPEDICS | Facility: CLINIC | Age: 64
End: 2024-08-12
Payer: COMMERCIAL

## 2024-08-12 DIAGNOSIS — M15.1 DEGENERATIVE ARTHRITIS OF DISTAL INTERPHALANGEAL JOINT OF RING FINGER OF RIGHT HAND: ICD-10-CM

## 2024-08-12 DIAGNOSIS — M15.1 DEGENERATIVE ARTHRITIS OF DISTAL INTERPHALANGEAL JOINT OF MIDDLE FINGER OF RIGHT HAND: ICD-10-CM

## 2024-08-12 DIAGNOSIS — M18.11 ARTHRITIS OF CARPOMETACARPAL (CMC) JOINT OF RIGHT THUMB: Primary | ICD-10-CM

## 2024-08-12 PROCEDURE — 99213 OFFICE O/P EST LOW 20 MIN: CPT | Mod: 25,S$GLB,, | Performed by: PHYSICIAN ASSISTANT

## 2024-08-12 PROCEDURE — 20600 DRAIN/INJ JOINT/BURSA W/O US: CPT | Mod: RT,S$GLB,, | Performed by: PHYSICIAN ASSISTANT

## 2024-08-12 RX ORDER — TRIAMCINOLONE ACETONIDE 40 MG/ML
40 INJECTION, SUSPENSION INTRA-ARTICULAR; INTRAMUSCULAR
Status: DISCONTINUED | OUTPATIENT
Start: 2024-08-12 | End: 2024-08-12 | Stop reason: HOSPADM

## 2024-08-12 RX ADMIN — TRIAMCINOLONE ACETONIDE 40 MG: 40 INJECTION, SUSPENSION INTRA-ARTICULAR; INTRAMUSCULAR at 08:08

## 2024-08-12 NOTE — PROCEDURES
Small Joint Aspiration/Injection: R thumb CMC    Date/Time: 8/12/2024 8:45 AM    Performed by: Tay Francisco PA  Authorized by: Tay Francisco PA    Consent Done?:  Yes (Verbal)  Indications:  Arthritis and pain  Site marked: the procedure site was marked    Timeout: prior to procedure the correct patient, procedure, and site was verified    Prep: patient was prepped and draped in usual sterile fashion      Local anesthesia used?: Yes    Location:  Thumb  Site:  R thumb CMC  Ultrasonic guidance for needle placement?: No    Needle size:  25 G  Medications:  40 mg triamcinolone acetonide 40 mg/mL  Patient tolerance:  Patient tolerated the procedure well with no immediate complications

## 2024-08-12 NOTE — PROGRESS NOTES
[facility]  [address]  [place] [address]  Phone: [phone]   [phone]    Patient's PCP: Maliha Monson NP  Referring Provider: No ref. provider found    Subjective:      Chief Complaint:   Chief Complaint   Patient presents with    Right Hand - Pain    Arthritis       Past Medical History:   Diagnosis Date    Cancer     bladder CA s/p TURBT    GERD (gastroesophageal reflux disease)     Hematuria 2021    Hyperlipidemia        Past Surgical History:   Procedure Laterality Date    APPENDECTOMY      COLONOSCOPY      CYSTOGRAM  2021    CYSTOSCOPY N/A 05/06/2021    Procedure: CYSTOSCOPY;  Surgeon: Yao Duffy MD;  Location: [facility];  Service: Urology;  Laterality: N/A;    ESOPHAGOGASTRODUODENOSCOPY N/A 03/21/2022    Procedure: EGD (ESOPHAGOGASTRODUODENOSCOPY);  Surgeon: Saurav Hartley MD;  Location: [facility];  Service: Endoscopy;  Laterality: N/A;    LUMBAR FUSION  2015    ROTATOR CUFF REPAIR Left 11/2022    TONSILLECTOMY      UPPER GASTROINTESTINAL ENDOSCOPY         Current Outpatient Medications   Medication Sig    albuterol (PROVENTIL/VENTOLIN HFA) 90 mcg/actuation inhaler INHALE 1 TO 2 PUFFS INTO THE LUNGS EVERY 4 TO 6 HOURS AS NEEDED COUGHING OR WHEEZING    ALPRAZolam (XANAX) 0.25 MG tablet Take 1 tablet (0.25 mg total) by mouth 2 (two) times daily as needed for Anxiety.    atorvastatin (LIPITOR) 40 MG tablet Take 1 tablet (40 mg total) by mouth every evening.    cetirizine (ZYRTEC) 10 MG tablet Take 1 tablet (10 mg total) by mouth once daily.    losartan (COZAAR) 25 MG tablet Take 1 tablet (25 mg total) by mouth once daily.    melatonin 10 mg Cap Take by mouth.    meloxicam (MOBIC) 15 MG tablet Take 1 tablet (15 mg total) by mouth daily as needed for Pain. Fill 90 tablets    pantoprazole (PROTONIX) 20 MG tablet 20 mg.     Current Facility-Administered Medications   Medication    acetaminophen tablet 650 mg    ondansetron disintegrating tablet 4 mg     triamcinolone acetonide injection 40 mg       Review of patient's allergies indicates:  No Known Allergies    Family History   Problem Relation Name Age of Onset    Heart disease Mother      Cancer Mother  70        colon, breast    Diabetes Mother      Colon cancer Mother      COPD Father      Cancer Father  65        prostate    Cancer Brother  58        prostate CA    Heart disease Brother          CHF    Stomach cancer Neg Hx      Rectal cancer Neg Hx      Colon polyps Neg Hx      Crohn's disease Neg Hx      Ulcerative colitis Neg Hx         Social History     Socioeconomic History    Marital status: Other   Occupational History     Employer: [employer]   Tobacco Use    Smoking status: Former     Current packs/day: 0.00     Average packs/day: 2.0 packs/day for 20.0 years (40.0 ttl pk-yrs)     Types: Cigarettes     Start date: 1973     Quit date: 1993     Years since quittin.2     Passive exposure: Past    Smokeless tobacco: Never   Substance and Sexual Activity    Alcohol use: Yes     Alcohol/week: 6.0 standard drinks of alcohol     Types: 6 Cans of beer per week     Comment: SOCIALLY     Drug use: No    Sexual activity: Yes     Social Determinants of Health     Financial Resource Strain: Low Risk  (2023)    [facility]    Overall Financial Resource Strain (CARDIA)     Difficulty of Paying Living Expenses: Not hard at all   Food Insecurity: No Food Insecurity (2023)    Received from [facility] [employer] [facility]    Hunger Vital Sign     Worried About Running Out of Food in the Last Year: Never true     Ran Out of Food in the Last Year: Never true   Transportation Needs: No Transportation Needs (2023)    Received from [facility] [employer] [facility]    PRAPARE - Transportation     Lack of Transportation (Medical): No     Lack of Transportation (Non-Medical): No   Physical Activity: Inactive (2023)    Received from [facility] [employer] [facility]    Exercise Vital Sign      Days of Exercise per Week: 0 days     Minutes of Exercise per Session: 0 min   Stress: Stress Concern Present (7/24/2023)    [facility]    [employer] [place] [employer] - Occupational Stress Questionnaire     Feeling of Stress : To some extent   Housing Stability: Low Risk  (7/24/2023)    [facility]    Housing Stability Vital Sign     Unable to Pay for Housing in the Last Year: No     Number of Places Lived in the Last Year: 1     In the last 12 months, was there a time when you did not have a steady place to sleep or slept in a shelter (including now)?: No       Prior to meeting with the patient I reviewed the medical chart in [facility]. This included reviewing the previous progress notes from our office, review of the patient's last appointment with their primary care provider, review of any visits to the emergency room, and review of any pain management appointments or procedures.    History of present illness: 63 y.o. male,  patient returns to clinic today for the right hand, specifically the right thumb.  He is having pain around the basilar joint, we gave him an injection few months ago.  This helped a lot.  He is interested in repeat injection.       Review of Systems:    Constitutional: Negative for chills, fever and weight loss.   HENT: Negative for congestion.    Eyes: Negative for discharge and redness.   Respiratory: Negative for cough and shortness of breath.    Cardiovascular: Negative for chest pain.   Gastrointestinal: Negative for nausea and vomiting.   Musculoskeletal: See HPI.   Skin: Negative for rash.   Neurological: Negative for headaches.   Endo/Heme/Allergies: Does not bruise/bleed easily.   Psychiatric/Behavioral: The patient is not nervous/anxious.    All other systems reviewed and are negative.       Objective:      Physical Examination:    Vital Signs:  There were no vitals filed for this visit.    There is no height or  weight on file to calculate BMI.    This a well-developed, well nourished patient in no acute distress.  They are alert and oriented and cooperative to examination.     Examination of the bilateral hands, skin is dry and intact, no erythema or ecchymosis, no signs symptoms of infection. Range of motion is full throughout the wrist hand and fingers. He can make a fist, he can oppose the thumb in the little finger. Cap refills less than 3 seconds. Distally neurovascularly intact. He is got pain at the basilar joint, he gets pain and crepitus with CMC grind testing.  He is also got some IP joint arthritis the distal IP of the right long finger to a greater extent and the D IP joint of the ring finger to a lesser extent.      Pertinent New Results:          XRAY Report / Interpretation:   No new x-rays were taken today but we did review them again.          Assessment:       1. Arthritis of carpometacarpal (CMC) joint of right thumb    2. 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    Follow-up on right thumb pain, patient with fairly advanced basilar arthritis.  We injected the basilar joint today.  We discussed CMC arthroplasty in great detail.  We also reviewed his x-rays, he has got some IP joint arthritis as well specifically the right long and right ring fingers.  However these are not bothering him.  He would like to think about surgery.  We went over in great detail.  Follow up with us  on an as-needed basis.        IGNACIO Carrillo, PA-C        This note was created using [employer] voice recognition software that occasionally misinterprets words or phrases.

## 2024-09-20 ENCOUNTER — CLINICAL SUPPORT (OUTPATIENT)
Dept: OTHER | Facility: CLINIC | Age: 64
End: 2024-09-20

## 2024-09-20 DIAGNOSIS — Z00.8 ENCOUNTER FOR OTHER GENERAL EXAMINATION: ICD-10-CM

## 2024-09-21 VITALS
WEIGHT: 235 LBS | DIASTOLIC BLOOD PRESSURE: 100 MMHG | SYSTOLIC BLOOD PRESSURE: 150 MMHG | BODY MASS INDEX: 30.16 KG/M2 | HEIGHT: 74 IN

## 2024-09-21 LAB
HDLC SERPL-MCNC: 50 MG/DL
POC CHOLESTEROL, LDL (DOCK): 78 MG/DL
POC CHOLESTEROL, TOTAL: 152 MG/DL
POC GLUCOSE, FASTING: 99 MG/DL (ref 60–110)
TRIGL SERPL-MCNC: 138 MG/DL

## 2024-10-24 ENCOUNTER — TELEPHONE (OUTPATIENT)
Dept: ORTHOPEDICS | Facility: CLINIC | Age: 64
End: 2024-10-24
Payer: COMMERCIAL

## 2024-10-29 ENCOUNTER — OFFICE VISIT (OUTPATIENT)
Dept: FAMILY MEDICINE | Facility: CLINIC | Age: 64
End: 2024-10-29
Payer: COMMERCIAL

## 2024-10-29 VITALS
OXYGEN SATURATION: 96 % | SYSTOLIC BLOOD PRESSURE: 132 MMHG | HEIGHT: 74 IN | BODY MASS INDEX: 30.16 KG/M2 | WEIGHT: 235 LBS | DIASTOLIC BLOOD PRESSURE: 70 MMHG | HEART RATE: 76 BPM

## 2024-10-29 DIAGNOSIS — M10.071 ACUTE IDIOPATHIC GOUT INVOLVING TOE OF RIGHT FOOT: Primary | ICD-10-CM

## 2024-10-29 DIAGNOSIS — Z87.01 HISTORY OF RECENT PNEUMONIA: ICD-10-CM

## 2024-10-29 PROCEDURE — 1159F MED LIST DOCD IN RCRD: CPT | Mod: CPTII,S$GLB,, | Performed by: NURSE PRACTITIONER

## 2024-10-29 PROCEDURE — 99213 OFFICE O/P EST LOW 20 MIN: CPT | Mod: S$GLB,,, | Performed by: NURSE PRACTITIONER

## 2024-10-29 PROCEDURE — 1160F RVW MEDS BY RX/DR IN RCRD: CPT | Mod: CPTII,S$GLB,, | Performed by: NURSE PRACTITIONER

## 2024-10-29 PROCEDURE — 1125F AMNT PAIN NOTED PAIN PRSNT: CPT | Mod: CPTII,S$GLB,, | Performed by: NURSE PRACTITIONER

## 2024-10-29 PROCEDURE — 1101F PT FALLS ASSESS-DOCD LE1/YR: CPT | Mod: CPTII,S$GLB,, | Performed by: NURSE PRACTITIONER

## 2024-10-29 PROCEDURE — 3288F FALL RISK ASSESSMENT DOCD: CPT | Mod: CPTII,S$GLB,, | Performed by: NURSE PRACTITIONER

## 2024-10-29 PROCEDURE — 4010F ACE/ARB THERAPY RXD/TAKEN: CPT | Mod: CPTII,S$GLB,, | Performed by: NURSE PRACTITIONER

## 2024-10-29 PROCEDURE — 3078F DIAST BP <80 MM HG: CPT | Mod: CPTII,S$GLB,, | Performed by: NURSE PRACTITIONER

## 2024-10-29 PROCEDURE — 3008F BODY MASS INDEX DOCD: CPT | Mod: CPTII,S$GLB,, | Performed by: NURSE PRACTITIONER

## 2024-10-29 PROCEDURE — 3075F SYST BP GE 130 - 139MM HG: CPT | Mod: CPTII,S$GLB,, | Performed by: NURSE PRACTITIONER

## 2024-10-29 RX ORDER — INDOMETHACIN 75 MG/1
75 CAPSULE, EXTENDED RELEASE ORAL 2 TIMES DAILY PRN
Qty: 20 CAPSULE | Refills: 0 | Status: SHIPPED | OUTPATIENT
Start: 2024-10-29

## 2024-10-29 RX ORDER — IRBESARTAN 150 MG/1
150 TABLET ORAL NIGHTLY
COMMUNITY

## 2024-10-30 ENCOUNTER — HOSPITAL ENCOUNTER (OUTPATIENT)
Dept: RADIOLOGY | Facility: HOSPITAL | Age: 64
Discharge: HOME | End: 2024-10-30
Attending: NURSE PRACTITIONER
Payer: COMMERCIAL

## 2024-10-30 DIAGNOSIS — Z87.01 HISTORY OF RECENT PNEUMONIA: ICD-10-CM

## 2024-10-30 PROCEDURE — 71046 X-RAY EXAM CHEST 2 VIEWS: CPT | Mod: TC,PO

## 2024-10-30 PROCEDURE — 71046 X-RAY EXAM CHEST 2 VIEWS: CPT | Mod: 26,,, | Performed by: RADIOLOGY

## 2024-11-07 ENCOUNTER — PATIENT MESSAGE (OUTPATIENT)
Dept: FAMILY MEDICINE | Facility: CLINIC | Age: 64
End: 2024-11-07
Payer: COMMERCIAL

## 2024-11-07 DIAGNOSIS — M10.071 ACUTE IDIOPATHIC GOUT INVOLVING TOE OF RIGHT FOOT: Primary | ICD-10-CM

## 2024-11-08 RX ORDER — METHYLPREDNISOLONE 4 MG/1
TABLET ORAL
Qty: 21 EACH | Refills: 0 | Status: SHIPPED | OUTPATIENT
Start: 2024-11-08 | End: 2024-11-29

## 2024-11-08 NOTE — TELEPHONE ENCOUNTER
Spoke to patient, medrol pended for this acute attack, has some indocin left still so will add the steroid, and discuss allopurinol as prevention in the future

## 2024-11-19 ENCOUNTER — TELEPHONE (OUTPATIENT)
Dept: FAMILY MEDICINE | Facility: CLINIC | Age: 64
End: 2024-11-19
Payer: COMMERCIAL

## 2024-11-19 DIAGNOSIS — M10.071 ACUTE IDIOPATHIC GOUT INVOLVING TOE OF RIGHT FOOT: ICD-10-CM

## 2024-11-19 DIAGNOSIS — E78.5 DYSLIPIDEMIA: ICD-10-CM

## 2024-11-19 DIAGNOSIS — Z12.5 PROSTATE CANCER SCREENING: ICD-10-CM

## 2024-11-19 DIAGNOSIS — I10 PRIMARY HYPERTENSION: Primary | ICD-10-CM

## 2024-12-03 ENCOUNTER — OFFICE VISIT (OUTPATIENT)
Dept: FAMILY MEDICINE | Facility: CLINIC | Age: 64
End: 2024-12-03
Payer: COMMERCIAL

## 2024-12-03 VITALS
OXYGEN SATURATION: 98 % | WEIGHT: 241 LBS | HEART RATE: 70 BPM | BODY MASS INDEX: 30.93 KG/M2 | DIASTOLIC BLOOD PRESSURE: 78 MMHG | SYSTOLIC BLOOD PRESSURE: 118 MMHG | HEIGHT: 74 IN

## 2024-12-03 DIAGNOSIS — Z85.51 HISTORY OF BLADDER CANCER: ICD-10-CM

## 2024-12-03 DIAGNOSIS — E78.5 DYSLIPIDEMIA: ICD-10-CM

## 2024-12-03 DIAGNOSIS — R73.01 IFG (IMPAIRED FASTING GLUCOSE): ICD-10-CM

## 2024-12-03 DIAGNOSIS — M79.671 RIGHT FOOT PAIN: ICD-10-CM

## 2024-12-03 DIAGNOSIS — Z00.00 ANNUAL PHYSICAL EXAM: Primary | ICD-10-CM

## 2024-12-03 DIAGNOSIS — I10 PRIMARY HYPERTENSION: ICD-10-CM

## 2024-12-03 PROCEDURE — 1160F RVW MEDS BY RX/DR IN RCRD: CPT | Mod: CPTII,S$GLB,, | Performed by: NURSE PRACTITIONER

## 2024-12-03 PROCEDURE — 3061F NEG MICROALBUMINURIA REV: CPT | Mod: CPTII,S$GLB,, | Performed by: NURSE PRACTITIONER

## 2024-12-03 PROCEDURE — 3288F FALL RISK ASSESSMENT DOCD: CPT | Mod: CPTII,S$GLB,, | Performed by: NURSE PRACTITIONER

## 2024-12-03 PROCEDURE — 3074F SYST BP LT 130 MM HG: CPT | Mod: CPTII,S$GLB,, | Performed by: NURSE PRACTITIONER

## 2024-12-03 PROCEDURE — 3008F BODY MASS INDEX DOCD: CPT | Mod: CPTII,S$GLB,, | Performed by: NURSE PRACTITIONER

## 2024-12-03 PROCEDURE — 1159F MED LIST DOCD IN RCRD: CPT | Mod: CPTII,S$GLB,, | Performed by: NURSE PRACTITIONER

## 2024-12-03 PROCEDURE — 99397 PER PM REEVAL EST PAT 65+ YR: CPT | Mod: S$GLB,,, | Performed by: NURSE PRACTITIONER

## 2024-12-03 PROCEDURE — 3078F DIAST BP <80 MM HG: CPT | Mod: CPTII,S$GLB,, | Performed by: NURSE PRACTITIONER

## 2024-12-03 PROCEDURE — 4010F ACE/ARB THERAPY RXD/TAKEN: CPT | Mod: CPTII,S$GLB,, | Performed by: NURSE PRACTITIONER

## 2024-12-03 PROCEDURE — 1101F PT FALLS ASSESS-DOCD LE1/YR: CPT | Mod: CPTII,S$GLB,, | Performed by: NURSE PRACTITIONER

## 2024-12-03 PROCEDURE — 3066F NEPHROPATHY DOC TX: CPT | Mod: CPTII,S$GLB,, | Performed by: NURSE PRACTITIONER

## 2024-12-03 PROCEDURE — 1125F AMNT PAIN NOTED PAIN PRSNT: CPT | Mod: CPTII,S$GLB,, | Performed by: NURSE PRACTITIONER

## 2024-12-03 NOTE — PROGRESS NOTES
SUBJECTIVE:    Patient ID: Jose Singh is a 64 y.o. male.    Chief Complaint: Annual Exam (No bottles//Pt is here for his annual exam and to discuss recent lab results//Pt stated that he is still having pain in his right foot//IAN )    Pt here for annual physical- f/u lipids, HTN and recent gout    Seen late October with right great toe/1st MTP pain- no significant erythema or warmth but was treated for possible gout with NSAIDs pt reports didn't seem to improve so was then sent in medrol dosepak and colchicine though reports that hasn't helped a lot. States pain isn't severe but continues to have pain- no erythema/warmth. Reports also having pain to midfoot now - he's thinking it may be just arthritis. Denies any trauma to the foot    Saw ortho PA in August for hand pain and had CMC joints injected which did help. Taking meloxicam daily    Follows with Dr. Wise for HTN and palpitations    Hx of TURBT by Dr. Duffy in 2021 for bladder cancer- now on annual cystos.  orders PSA        Telephone on 11/19/2024   Component Date Value Ref Range Status    WBC 11/26/2024 5.9  3.4 - 10.8 x10E3/uL Final    RBC 11/26/2024 4.71  4.14 - 5.80 x10E6/uL Final    Hemoglobin 11/26/2024 14.6  13.0 - 17.7 g/dL Final    Hematocrit 11/26/2024 44.4  37.5 - 51.0 % Final    MCV 11/26/2024 94  79 - 97 fL Final    MCH 11/26/2024 31.0  26.6 - 33.0 pg Final    MCHC 11/26/2024 32.9  31.5 - 35.7 g/dL Final    RDW 11/26/2024 13.0  11.6 - 15.4 % Final    Platelets 11/26/2024 260  150 - 450 x10E3/uL Final    Neutrophils 11/26/2024 64  Not Estab. % Final    Lymphs 11/26/2024 25  Not Estab. % Final    Monocytes 11/26/2024 9  Not Estab. % Final    Eos 11/26/2024 1  Not Estab. % Final    Basos 11/26/2024 1  Not Estab. % Final    Neutrophils (Absolute) 11/26/2024 3.8  1.4 - 7.0 x10E3/uL Final    Lymphs (Absolute) 11/26/2024 1.5  0.7 - 3.1 x10E3/uL Final    Monocytes(Absolute) 11/26/2024 0.5  0.1 - 0.9 x10E3/uL Final    Eos  (Absolute) 11/26/2024 0.1  0.0 - 0.4 x10E3/uL Final    Baso (Absolute) 11/26/2024 0.1  0.0 - 0.2 x10E3/uL Final    Immature Granulocytes 11/26/2024 0  Not Estab. % Final    Immature Grans (Abs) 11/26/2024 0.0  0.0 - 0.1 x10E3/uL Final    Glucose 11/26/2024 115 (H)  70 - 99 mg/dL Final    BUN 11/26/2024 15  8 - 27 mg/dL Final    Creatinine 11/26/2024 1.17  0.76 - 1.27 mg/dL Final    eGFR 11/26/2024 69  >59 mL/min/1.73 Final    BUN/Creatinine Ratio 11/26/2024 13  10 - 24 Final    Sodium 11/26/2024 140  134 - 144 mmol/L Final    Potassium 11/26/2024 4.7  3.5 - 5.2 mmol/L Final    Chloride 11/26/2024 105  96 - 106 mmol/L Final    CO2 11/26/2024 25  20 - 29 mmol/L Final    Calcium 11/26/2024 9.1  8.6 - 10.2 mg/dL Final    Protein, Total 11/26/2024 6.3  6.0 - 8.5 g/dL Final    Albumin 11/26/2024 3.9  3.9 - 4.9 g/dL Final    Globulin, Total 11/26/2024 2.4  1.5 - 4.5 g/dL Final    Total Bilirubin 11/26/2024 0.3  0.0 - 1.2 mg/dL Final    Alkaline Phosphatase 11/26/2024 101  44 - 121 IU/L Final    AST 11/26/2024 29  0 - 40 IU/L Final    ALT 11/26/2024 33  0 - 44 IU/L Final    Cholesterol 11/26/2024 157  100 - 199 mg/dL Final    Triglycerides 11/26/2024 218 (H)  0 - 149 mg/dL Final    HDL 11/26/2024 48  >39 mg/dL Final    VLDL Cholesterol Juliano 11/26/2024 36  5 - 40 mg/dL Final    LDL Calculated 11/26/2024 73  0 - 99 mg/dL Final    TSH 11/26/2024 2.420  0.450 - 4.500 uIU/mL Final    Specific Silt, UA 11/26/2024 1.016  1.005 - 1.030 Final    pH, UA 11/26/2024 5.5  5.0 - 7.5 Final    Color, UA 11/26/2024 Yellow  Yellow Final    Clarity, UA 11/26/2024 Clear  Clear Final    Leukocytes, UA 11/26/2024 Negative  Negative Final    Protein, UA 11/26/2024 Negative  Negative/Trace Final    Glucose, UA 11/26/2024 Negative  Negative Final    Ketones, UA 11/26/2024 Negative  Negative Final    Occult Blood UA 11/26/2024 Negative  Negative Final    Bilirubin, UA 11/26/2024 Negative  Negative Final    Urobilinogen, UA 11/26/2024 0.2  0.2 -  1.0 mg/dL Final    Nitrite, UA 11/26/2024 Negative  Negative Final    Microscopic Examination 11/26/2024 Comment   Final    Microscopic Examination 11/26/2024 See below:   Final    Urinalysis Reflex 11/26/2024 Comment   Final    Uric Acid 11/26/2024 6.6  3.8 - 8.4 mg/dL Final    Creatinine, Urine 11/26/2024 109.5  Not Estab. mg/dL Final    Microalb, Ur 11/26/2024 7.8  Not Estab. ug/mL Final    Microalb/Crt. Ratio 11/26/2024 7  0 - 29 mg/g creat Final    WBC, UA 11/26/2024 None seen  0 - 5 /hpf Final    RBC, UA 11/26/2024 None seen  0 - 2 /hpf Final    Epithelial Cells (non renal) 11/26/2024 None seen  0 - 10 /hpf Final    Casts 11/26/2024 None seen  None seen /lpf Final    Bacteria, UA 11/26/2024 None seen  None seen/Few Final   Clinical Support on 09/20/2024   Component Date Value Ref Range Status    POC Cholesterol, Total 09/20/2024 152  <240 MG/DL Final    POC Cholesterol, HDL 09/20/2024 50  MG/DL Final    POC Cholesterol, LDL 09/20/2024 78  <160 MG/DL Final    POC Triglycerides 09/20/2024 138  <160 MG/DL Final    POC Glucose, Fasting 09/20/2024 99  60 - 110 MG/DL Final       Past Medical History:   Diagnosis Date    Cancer     bladder CA s/p TURBT    GERD (gastroesophageal reflux disease)     Hematuria 2021    Hyperlipidemia      Past Surgical History:   Procedure Laterality Date    APPENDECTOMY      COLONOSCOPY      CYSTOGRAM  2021    CYSTOSCOPY N/A 05/06/2021    Procedure: CYSTOSCOPY;  Surgeon: Yao Duffy MD;  Location: [facility] OR;  Service: Urology;  Laterality: N/A;    ESOPHAGOGASTRODUODENOSCOPY N/A 03/21/2022    Procedure: EGD (ESOPHAGOGASTRODUODENOSCOPY);  Surgeon: Saurav Hartley MD;  Location: [facility];  Service: Endoscopy;  Laterality: N/A;    LUMBAR FUSION  2015    ROTATOR CUFF REPAIR Left 11/2022    TONSILLECTOMY      UPPER GASTROINTESTINAL ENDOSCOPY       Family History   Problem Relation Name Age of Onset    Heart disease Mother      Cancer Mother  70        colon, breast    Diabetes  Mother      Colon cancer Mother      COPD Father      Cancer Father  65        prostate    Cancer Brother  58        prostate CA    Heart disease Brother          CHF    Stomach cancer Neg Hx      Rectal cancer Neg Hx      Colon polyps Neg Hx      Crohn's disease Neg Hx      Ulcerative colitis Neg Hx         All of your core healthy metrics are met.      The 10-year CVD risk score (Josefina, et al., 2008) is: 16.8%    Values used to calculate the score:      Age: 64 years      Sex: Male      Diabetic: No      Tobacco smoker: No      Systolic Blood Pressure: 118 mmHg      Is BP treated: Yes      HDL Cholesterol: 48 mg/dL      Total Cholesterol: 157 mg/dL     Marital Status: Other  Alcohol History:  reports current alcohol use of about 6.0 standard drinks of alcohol per week.  Tobacco History:  reports that he quit smoking about 31 years ago. His smoking use included cigarettes. He started smoking about 51 years ago. He has a 40 pack-year smoking history. He has been exposed to tobacco smoke. He has never used smokeless tobacco.  Drug History:  reports no history of drug use.    Health Maintenance Topics with due status: Not Due       Topic Last Completion Date    Colorectal Cancer Screening 06/22/2020    TETANUS VACCINE 06/07/2021    Lipid Panel 11/26/2024     Immunization History   Administered Date(s) Administered    Influenza - Quadrivalent - PF *Preferred* (6 months and older) 01/11/2011, 12/10/2013    Influenza Split 12/10/2013    Tdap 06/07/2021    Zoster 05/06/2013    Zoster Recombinant 12/31/2019, 03/02/2020       Review of patient's allergies indicates:  No Known Allergies    Current Outpatient Medications:     albuterol (PROVENTIL/VENTOLIN HFA) 90 mcg/actuation inhaler, INHALE 1 TO 2 PUFFS INTO THE LUNGS EVERY 4 TO 6 HOURS AS NEEDED COUGHING OR WHEEZING, Disp: , Rfl:     ALPRAZolam (XANAX) 0.25 MG tablet, Take 1 tablet (0.25 mg total) by mouth 2 (two) times daily as needed for Anxiety., Disp: 20 tablet,  Rfl: 1    atorvastatin (LIPITOR) 40 MG tablet, Take 1 tablet (40 mg total) by mouth every evening., Disp: 90 tablet, Rfl: 3    cetirizine (ZYRTEC) 10 MG tablet, Take 1 tablet (10 mg total) by mouth once daily., Disp: 90 tablet, Rfl: 3    colchicine (COLCRYS) 0.6 mg tablet, Take 2 tablets today then 1 tablet BID for gout pain- continue until pain free for 24 hours then may stop, Disp: 20 tablet, Rfl: 0    indomethacin (INDOCIN SR) 75 mg CpSR CR capsule, Take 1 capsule (75 mg total) by mouth 2 (two) times daily as needed (gout pain). Take with food, Disp: 20 capsule, Rfl: 0    irbesartan (AVAPRO) 150 MG tablet, Take 150 mg by mouth every evening., Disp: , Rfl:     melatonin 10 mg Cap, Take by mouth., Disp: , Rfl:     meloxicam (MOBIC) 15 MG tablet, Take 1 tablet (15 mg total) by mouth daily as needed for Pain. Fill 90 tablets, Disp: 90 tablet, Rfl: 3    pantoprazole (PROTONIX) 20 MG tablet, 20 mg., Disp: , Rfl: 3    Current Facility-Administered Medications:     acetaminophen tablet 650 mg, 650 mg, Oral, Once PRN, Alirio Hunter MD    ondansetron disintegrating tablet 4 mg, 4 mg, Oral, Once PRN, Alirio Hunter MD    Review of Systems   Constitutional:  Positive for unexpected weight change (wt up 10lbs since April). Negative for appetite change, chills and fever.   HENT:  Negative for sore throat and trouble swallowing.    Eyes:  Negative for visual disturbance.   Respiratory:  Negative for cough, shortness of breath and wheezing.    Cardiovascular:  Negative for chest pain, palpitations and leg swelling.   Gastrointestinal:  Negative for abdominal pain, constipation, diarrhea, nausea and vomiting.   Genitourinary:  Negative for dysuria, frequency and hematuria.   Musculoskeletal:  Positive for arthralgias (right foot pain, bilat CMC joint pain) and back pain. Negative for gait problem.   Skin:  Negative for rash.   Neurological:  Negative for dizziness, syncope, speech difficulty, numbness and headaches.  "  Psychiatric/Behavioral:  Positive for dysphoric mood (grieving). Negative for self-injury, sleep disturbance and suicidal ideas. The patient is not nervous/anxious.           Objective:      Vitals:    12/03/24 0804   BP: 118/78   Pulse: 70   SpO2: 98%   Weight: 109.3 kg (241 lb)   Height: 6' 2" (1.88 m)     Physical Exam  Vitals and nursing note reviewed.   Constitutional:       General: He is not in acute distress.     Appearance: Normal appearance. He is well-developed.   HENT:      Head: Normocephalic and atraumatic.      Right Ear: Tympanic membrane and ear canal normal.      Left Ear: Tympanic membrane and ear canal normal.   Neck:      Vascular: No carotid bruit.   Cardiovascular:      Rate and Rhythm: Normal rate and regular rhythm.      Heart sounds: No murmur heard.  Pulmonary:      Effort: Pulmonary effort is normal. No respiratory distress.      Breath sounds: Normal breath sounds. No wheezing or rales.   Abdominal:      Palpations: Abdomen is soft.      Tenderness: There is no abdominal tenderness.   Musculoskeletal:      Cervical back: Neck supple.   Lymphadenopathy:      Cervical: No cervical adenopathy.   Skin:     General: Skin is warm and dry.      Findings: No rash.   Neurological:      General: No focal deficit present.      Mental Status: He is alert and oriented to person, place, and time.      Gait: Gait normal.   Psychiatric:         Mood and Affect: Mood normal.         Behavior: Behavior normal. Behavior is cooperative.         Thought Content: Thought content normal.         Cognition and Memory: Cognition normal.           Assessment:       1. Annual physical exam    2. Primary hypertension    3. Dyslipidemia    4. IFG (impaired fasting glucose)    5. Right foot pain           Plan:       1. Annual physical exam   -reviewed recent labs with pt    2. Primary hypertension   -BP well controlled    3. Dyslipidemia   -mildly elevated TG, LDL well controlled on labs, continue statin and " discussed diet changes needed    4. IFG (impaired fasting glucose)  -, A1C today 5.6%. encouraged to cut out sweets/sugars and also processed carbs  -     Hemoglobin A1C, POCT    5. Right foot pain   -uric acid 6.6, no obvious exam findings to suggest acute gout, will refer to podiatry  -     Ambulatory referral/consult to Podiatry; Future; Expected date: 12/10/2024    6. History of bladder cancer   -now having annual cystos by JELENA Wolf    Follow up in about 1 year (around 12/3/2025) for annual visit.          Counseled on age and gender appropriate medical preventative services, including cancer screenings, immunizations, overall nutritional health, need for a consistent exercise regimen and an overall push towards maintaining a vigorous and active lifestyle.      12/3/2024 Maliha Monson NP

## 2024-12-09 ENCOUNTER — OFFICE VISIT (OUTPATIENT)
Dept: PODIATRY | Facility: CLINIC | Age: 64
End: 2024-12-09
Payer: COMMERCIAL

## 2024-12-09 VITALS — WEIGHT: 240.75 LBS | BODY MASS INDEX: 30.91 KG/M2

## 2024-12-09 DIAGNOSIS — M79.671 RIGHT FOOT PAIN: ICD-10-CM

## 2024-12-09 DIAGNOSIS — M20.5X1 HALLUX LIMITUS OF RIGHT FOOT: Primary | ICD-10-CM

## 2024-12-09 PROCEDURE — 99203 OFFICE O/P NEW LOW 30 MIN: CPT | Mod: 25,S$GLB,, | Performed by: PODIATRIST

## 2024-12-09 PROCEDURE — 99999 PR PBB SHADOW E&M-EST. PATIENT-LVL III: CPT | Mod: PBBFAC,,, | Performed by: PODIATRIST

## 2024-12-09 PROCEDURE — 1101F PT FALLS ASSESS-DOCD LE1/YR: CPT | Mod: CPTII,S$GLB,, | Performed by: PODIATRIST

## 2024-12-09 PROCEDURE — 3008F BODY MASS INDEX DOCD: CPT | Mod: CPTII,S$GLB,, | Performed by: PODIATRIST

## 2024-12-09 PROCEDURE — 4010F ACE/ARB THERAPY RXD/TAKEN: CPT | Mod: CPTII,S$GLB,, | Performed by: PODIATRIST

## 2024-12-09 PROCEDURE — 3066F NEPHROPATHY DOC TX: CPT | Mod: CPTII,S$GLB,, | Performed by: PODIATRIST

## 2024-12-09 PROCEDURE — 1126F AMNT PAIN NOTED NONE PRSNT: CPT | Mod: CPTII,S$GLB,, | Performed by: PODIATRIST

## 2024-12-09 PROCEDURE — 1160F RVW MEDS BY RX/DR IN RCRD: CPT | Mod: CPTII,S$GLB,, | Performed by: PODIATRIST

## 2024-12-09 PROCEDURE — 3061F NEG MICROALBUMINURIA REV: CPT | Mod: CPTII,S$GLB,, | Performed by: PODIATRIST

## 2024-12-09 PROCEDURE — 1159F MED LIST DOCD IN RCRD: CPT | Mod: CPTII,S$GLB,, | Performed by: PODIATRIST

## 2024-12-09 PROCEDURE — 3288F FALL RISK ASSESSMENT DOCD: CPT | Mod: CPTII,S$GLB,, | Performed by: PODIATRIST

## 2024-12-09 PROCEDURE — 20600 DRAIN/INJ JOINT/BURSA W/O US: CPT | Mod: RT,S$GLB,, | Performed by: PODIATRIST

## 2024-12-09 RX ORDER — BUPIVACAINE HYDROCHLORIDE 5 MG/ML
1.5 INJECTION, SOLUTION PERINEURAL
Status: COMPLETED | OUTPATIENT
Start: 2024-12-09 | End: 2024-12-09

## 2024-12-09 RX ORDER — DEXAMETHASONE SODIUM PHOSPHATE 4 MG/ML
4 INJECTION, SOLUTION INTRA-ARTICULAR; INTRALESIONAL; INTRAMUSCULAR; INTRAVENOUS; SOFT TISSUE
Status: COMPLETED | OUTPATIENT
Start: 2024-12-09 | End: 2024-12-09

## 2024-12-09 RX ORDER — METHYLPREDNISOLONE ACETATE 40 MG/ML
20 INJECTION, SUSPENSION INTRA-ARTICULAR; INTRALESIONAL; INTRAMUSCULAR; SOFT TISSUE
Status: COMPLETED | OUTPATIENT
Start: 2024-12-09 | End: 2024-12-09

## 2024-12-09 RX ADMIN — BUPIVACAINE HYDROCHLORIDE 7.5 MG: 5 INJECTION, SOLUTION PERINEURAL at 08:12

## 2024-12-09 RX ADMIN — METHYLPREDNISOLONE ACETATE 20 MG: 40 INJECTION, SUSPENSION INTRA-ARTICULAR; INTRALESIONAL; INTRAMUSCULAR; SOFT TISSUE at 08:12

## 2024-12-09 RX ADMIN — DEXAMETHASONE SODIUM PHOSPHATE 4 MG: 4 INJECTION, SOLUTION INTRA-ARTICULAR; INTRALESIONAL; INTRAMUSCULAR; INTRAVENOUS; SOFT TISSUE at 08:12

## 2024-12-09 NOTE — PROGRESS NOTES
[address] [facility] [address]  [place] [address]  Phone: [phone]   [phone]    Patient's PCP:Maliha Monson NP  Referring Provider: Maliha Monson    Subjective:      Chief Complaint:: Foot Pain (RT top of foot)    Foot Pain  Associated symptoms include arthralgias and joint swelling. Pertinent negatives include no abdominal pain, chest pain, chills, coughing, fatigue, fever, headaches, myalgias, nausea, rash or weakness.   Jose Singh is a 64 y.o. male who presents today with a complaint of RT foot pain, top of foot. The current episode started 6 weeks ago.  The symptoms include aching pain. Probable cause of complaint unknown.  The symptoms are aggravated by walking. The problem has improved. Treatment to date have included medication from PCP which provided no relief.       Vitals:    12/09/24 1541   Weight: 109.2 kg (240 lb 11.9 oz)   PainSc: 0-No pain      Shoe Size: 11.5    Past Surgical History:   Procedure Laterality Date    APPENDECTOMY      COLONOSCOPY      CYSTOGRAM  2021    CYSTOSCOPY N/A 05/06/2021    Procedure: CYSTOSCOPY;  Surgeon: Yao Duffy MD;  Location: [facility];  Service: Urology;  Laterality: N/A;    ESOPHAGOGASTRODUODENOSCOPY N/A 03/21/2022    Procedure: EGD (ESOPHAGOGASTRODUODENOSCOPY);  Surgeon: Saurav Hartley MD;  Location: [facility];  Service: Endoscopy;  Laterality: N/A;    LUMBAR FUSION  2015    ROTATOR CUFF REPAIR Left 11/2022    TONSILLECTOMY      UPPER GASTROINTESTINAL ENDOSCOPY       Past Medical History:   Diagnosis Date    Cancer     bladder CA s/p TURBT    GERD (gastroesophageal reflux disease)     Hematuria 2021    Hyperlipidemia      Family History   Problem Relation Name Age of Onset    Heart disease Mother      Cancer Mother  70        colon, breast    Diabetes Mother      Colon cancer Mother      COPD Father      Cancer Father  65        prostate    Cancer Brother  58        prostate CA    Heart disease Brother          CHF    Stomach  cancer Neg Hx      Rectal cancer Neg Hx      Colon polyps Neg Hx      Crohn's disease Neg Hx      Ulcerative colitis Neg Hx          Social History:   Marital Status:   Alcohol History:  reports current alcohol use of about 6.0 standard drinks of alcohol per week.  Tobacco History:  reports that he quit smoking about 31 years ago. His smoking use included cigarettes. He started smoking about 51 years ago. He has a 40 pack-year smoking history. He has been exposed to tobacco smoke. He has never used smokeless tobacco.  Drug History:  reports no history of drug use.    Review of patient's allergies indicates:  No Known Allergies    Current Outpatient Medications   Medication Sig Dispense Refill    albuterol (PROVENTIL/VENTOLIN HFA) 90 mcg/actuation inhaler INHALE 1 TO 2 PUFFS INTO THE LUNGS EVERY 4 TO 6 HOURS AS NEEDED COUGHING OR WHEEZING      ALPRAZolam (XANAX) 0.25 MG tablet Take 1 tablet (0.25 mg total) by mouth 2 (two) times daily as needed for Anxiety. 20 tablet 1    atorvastatin (LIPITOR) 40 MG tablet Take 1 tablet (40 mg total) by mouth every evening. 90 tablet 3    cetirizine (ZYRTEC) 10 MG tablet Take 1 tablet (10 mg total) by mouth once daily. 90 tablet 3    colchicine (COLCRYS) 0.6 mg tablet Take 2 tablets today then 1 tablet BID for gout pain- continue until pain free for 24 hours then may stop 20 tablet 0    indomethacin (INDOCIN SR) 75 mg CpSR CR capsule Take 1 capsule (75 mg total) by mouth 2 (two) times daily as needed (gout pain). Take with food 20 capsule 0    irbesartan (AVAPRO) 150 MG tablet Take 150 mg by mouth every evening.      melatonin 10 mg Cap Take by mouth.      meloxicam (MOBIC) 15 MG tablet Take 1 tablet (15 mg total) by mouth daily as needed for Pain. Fill 90 tablets 90 tablet 3    pantoprazole (PROTONIX) 20 MG tablet 20 mg.  3     Current Facility-Administered Medications   Medication Dose Route Frequency Provider Last Rate Last Admin    acetaminophen tablet 650 mg  650 mg Oral  Once PRN Alirio Hunter MD        ondansetron disintegrating tablet 4 mg  4 mg Oral Once PRN Alirio Hunter MD           Review of Systems   Constitutional:  Negative for chills, fatigue, fever and unexpected weight change.   HENT:  Negative for hearing loss and trouble swallowing.    Eyes:  Negative for photophobia and visual disturbance.   Respiratory:  Negative for cough, shortness of breath and wheezing.    Cardiovascular:  Negative for chest pain, palpitations and leg swelling.   Gastrointestinal:  Negative for abdominal pain and nausea.   Genitourinary:  Negative for dysuria and frequency.   Musculoskeletal:  Positive for arthralgias and joint swelling. Negative for back pain, gait problem and myalgias.   Skin:  Negative for rash.   Neurological:  Negative for tremors, seizures, speech difficulty, weakness and headaches.   Hematological:  Does not bruise/bleed easily.         Objective:        Physical Exam:   Foot Exam    General  General Appearance: appears stated age and healthy   Orientation: alert and oriented to person, place, and time   Affect: appropriate   Gait: unimpaired       Right Foot/Ankle     Inspection and Palpation  Ecchymosis: none  Tenderness: great toe metatarsophalangeal joint   Swelling: great toe metatarsophalangeal joint   Arch: normal  Hallux limitus: yes  Skin Exam: skin intact; no drainage, no ulcer and no erythema   Neurovascular  Dorsalis pedis: 2+  Posterior tibial: 2+  Capillary Refill: 2+  Varicose veins: not present  Saphenous nerve sensation: normal  Tibial nerve sensation: normal  Superficial peroneal nerve sensation: normal  Deep peroneal nerve sensation: normal  Sural nerve sensation: normal    Edema  Type of edema: non-pitting    Muscle Strength  Ankle dorsiflexion: 5  Ankle plantar flexion: 5  Ankle inversion: 5  Ankle eversion: 5  Great toe extension: 5  Great toe flexion: 5    Range of Motion    Passive  1st MTP extension: 30  1st MTP flexion: 15,  pain      Tests  Anterior drawer: negative   Talar tilt: negative   PT Tinel's sign: negative    Paresthesia: negative        Physical Exam  Cardiovascular:      Pulses:           Dorsalis pedis pulses are 2+ on the right side.        Posterior tibial pulses are 2+ on the right side.   Feet:      Right foot:      Skin integrity: No ulcer or erythema.               Right Ankle/Foot Exam     Swelling   The patient is swollen on the great toe metatarsophalangeal joint.    Tenderness   The patient is tender to palpation of the great toe metatarsophalangeal joint.        Muscle Strength   Right Lower Extremity   Ankle Dorsiflexion:  5   Plantar flexion:  5/5    Vascular Exam     Right Pulses  Dorsalis Pedis:      2+  Posterior Tibial:      2+           Imaging: none            Assessment:       1. Hallux limitus of right foot    2. Right foot pain      Plan:   Hallux limitus of right foot  -     methylPREDNISolone acetate injection 20 mg  -     BUPivacaine injection 7.5 mg  -     dexAMETHasone injection 4 mg    Right foot pain  -     Ambulatory referral/consult to Podiatry  -     methylPREDNISolone acetate injection 20 mg  -     BUPivacaine injection 7.5 mg  -     dexAMETHasone injection 4 mg      Follow up if symptoms worsen or fail to improve.    I explained to the pt the 4 stages of osteoarthritic changes of the 1st MPJ starting with functional loss of range of motion and eventual destruction of the cartilage causing increased deformity, pain and loss of motion.  I discuss the treatment of the early stages 1 and 2 with shoe modification, NSAIDs, possible cortisone shots and CMOs.     Procedures Informed consent was obtained.  Time-out was called.  The area was prepped with alcohol, and a steroid injection was performed at right 1st MPJ using 1.5 cc of 0.5% Marcaine w/out epi, 1 cc Dexamethasone, 0.5 cc Methylprednisolone. Patient tolerated the procedure well.             Counseling:     I provided patient education  verbally regarding:   Patient diagnosis, treatment options, as well as alternatives, risks, and benefits.     This note was created using Dragon voice recognition software that occasionally misinterpreted phrases or words.

## 2024-12-09 NOTE — PATIENT INSTRUCTIONS
Stiff Big Toe (Hallux Rigidus)    The most common site of arthritis in the foot is at the base of the big toe. This joint is called the metatarsophalangeal, or MTP joint. It's important because it has to bend every time you take a step. If the joint starts to stiffen, walking can become painful and difficult.    In the MTP joint, as in any joint, the ends of the bones are covered by a smooth articular cartilage. If wear-and-tear or injury damage the articular cartilage, the raw bone ends can rub together. A bone spur, or overgrowth, may develop on the top of the bone. This overgrowth can prevent the toe from bending as much as it needs to when you walk. The result is a stiff big toe, or hallux rigidus.    Hallux rigidus usually develops in adults between the ages of 30 and 60 years. No one knows why it appears in some people and not others. It may result from an injury to the toe that damages the articular cartilage or from differences in foot anatomy that increase stress on the joint.      Symptoms  Pain in the joint when you are active, especially as you push-off on the toes when you walk  Swelling around the joint  A bump, like a bunion or callus, that develops on the top of the foot  Stiffness in the great toe and an inability to bend it up or down      Diagnosis  If you find it difficult to bend your toe up and down or find that you are walking on the outside of your foot because of pain in the toe, see your doctor right away. Hallux rigidus is easier to treat when the condition is caught early. If you wait until you see a bony bump on the top of your foot, the bone spurs will have already developed and the condition will be more difficult to treat.    Your physician will examine your foot and look for evidence of bone spurs. He or she may move the toe around to see how much motion is possible without pain. X-rays will show the location and size of any bone spurs, as well as the degree of degeneration in the  joint space and cartilage.    An osteophyte, or bone spur.        Treatment    Nonsurgical Treatment    Pain relievers and anti-inflammatory medications such as ibuprofen may help reduce the swelling and ease the pain. Applying ice packs or taking contrast baths (described below) may also help reduce inflammation and control symptoms for a short period of time. But they aren't enough to stop the condition from progressing. Wearing a shoe with a large toe box will reduce the pressure on the toe, and you will probably have to give up wearing high heels. Your doctor may recommend that you get a stiff-soled shoe with a rocker or roller bottom design and possibly even a steel shank or metal brace in the sole. This type of shoe supports the foot when you walk and reduces the amount of bend in the big toe.    A contrast bath uses alternating cold and hot water to reduce inflammation. You'll need two buckets, one with water as cold as you can tolerate and the other with water as warm as you can tolerate. Immerse your foot in the cold water for 30 seconds, then immediately place it in the hot water for 30 seconds. Continue to alternate between cold and hot for five minutes, ending in the cold water. You can do contrast baths up to three times a day. However, be careful to avoid extreme temperatures in the water, especially if your feet aren't very sensitive to heat or cold.        Surgical Treatment    Cheilectomy (kI-lek'-toe-me). This surgery is usually recommended when damage is mild or moderate. It involves removing the bone spurs as well as a portion of the foot bone, so the toe has more room to bend. The incision is made on the top of the foot. The toe and the operative site may remain swollen for several months after the operation, and you will have to wear a wooden-soled sandal for at least two weeks after the surgery. But most patients do experience long-term relief.    Arthrodesis (wcb-tcyhf-uvv'-sis). Fusing the  bones together (arthrodesis) is often recommended when the damage to the cartilage is severe. The damaged cartilage is removed and pins, screws, or a plate are used to fix the joint in a permanent position. Gradually, the bones grow together. This type of surgery means that you will not be able to bend the toe at all. However, it is the most reliable way to reduce pain in these severe cases.    For the first six weeks after surgery, you will have to wear a cast and then use crutches for about another six weeks. You won't be able to wear high heels, and you may need to wear a shoe with a rocker-type sole.    Arthroplasty (lqx-urczu-zhyh'-prosper) . Older patients who place few functional demands on the feet may be candidates for joint replacement surgery. The joint surfaces are removed and an artificial joint is implanted. This procedure may relieve pain and preserve joint motion.

## 2024-12-11 ENCOUNTER — OFFICE VISIT (OUTPATIENT)
Dept: ORTHOPEDICS | Facility: CLINIC | Age: 64
End: 2024-12-11
Payer: COMMERCIAL

## 2024-12-11 VITALS — HEIGHT: 74 IN | BODY MASS INDEX: 30.9 KG/M2 | WEIGHT: 240.75 LBS

## 2024-12-11 DIAGNOSIS — M18.11 ARTHRITIS OF CARPOMETACARPAL (CMC) JOINT OF RIGHT THUMB: Primary | ICD-10-CM

## 2024-12-11 DIAGNOSIS — M18.12 ARTHRITIS OF CARPOMETACARPAL (CMC) JOINT OF LEFT THUMB: ICD-10-CM

## 2024-12-11 PROCEDURE — 99999 PR PBB SHADOW E&M-EST. PATIENT-LVL III: CPT | Mod: PBBFAC,,, | Performed by: PHYSICIAN ASSISTANT

## 2024-12-11 PROCEDURE — 3066F NEPHROPATHY DOC TX: CPT | Mod: CPTII,S$GLB,, | Performed by: PHYSICIAN ASSISTANT

## 2024-12-11 PROCEDURE — 4010F ACE/ARB THERAPY RXD/TAKEN: CPT | Mod: CPTII,S$GLB,, | Performed by: PHYSICIAN ASSISTANT

## 2024-12-11 PROCEDURE — 3061F NEG MICROALBUMINURIA REV: CPT | Mod: CPTII,S$GLB,, | Performed by: PHYSICIAN ASSISTANT

## 2024-12-11 PROCEDURE — 1159F MED LIST DOCD IN RCRD: CPT | Mod: CPTII,S$GLB,, | Performed by: PHYSICIAN ASSISTANT

## 2024-12-11 PROCEDURE — 3008F BODY MASS INDEX DOCD: CPT | Mod: CPTII,S$GLB,, | Performed by: PHYSICIAN ASSISTANT

## 2024-12-11 PROCEDURE — 1125F AMNT PAIN NOTED PAIN PRSNT: CPT | Mod: CPTII,S$GLB,, | Performed by: PHYSICIAN ASSISTANT

## 2024-12-11 PROCEDURE — 20600 DRAIN/INJ JOINT/BURSA W/O US: CPT | Mod: 50,S$GLB,, | Performed by: PHYSICIAN ASSISTANT

## 2024-12-11 PROCEDURE — 99213 OFFICE O/P EST LOW 20 MIN: CPT | Mod: 25,S$GLB,, | Performed by: PHYSICIAN ASSISTANT

## 2024-12-11 RX ORDER — TRIAMCINOLONE ACETONIDE 40 MG/ML
40 INJECTION, SUSPENSION INTRA-ARTICULAR; INTRAMUSCULAR
Status: DISCONTINUED | OUTPATIENT
Start: 2024-12-11 | End: 2024-12-11 | Stop reason: HOSPADM

## 2024-12-11 RX ADMIN — TRIAMCINOLONE ACETONIDE 40 MG: 40 INJECTION, SUSPENSION INTRA-ARTICULAR; INTRAMUSCULAR at 01:12

## 2024-12-11 NOTE — PROCEDURES
Small Joint Aspiration/Injection: R thumb CMC    Date/Time: 12/11/2024 1:30 PM    Performed by: Tay Francisco PA  Authorized by: Tay Francisco PA    Consent Done?:  Yes (Verbal)  Indications:  Pain  Site marked: the procedure site was marked    Timeout: prior to procedure the correct patient, procedure, and site was verified    Prep: patient was prepped and draped in usual sterile fashion      Local anesthesia used?: Yes    Local anesthetic:  Lidocaine 1% without epinephrine  Location:  Thumb  Site:  R thumb CMC  Ultrasonic guidance for needle placement?: No    Needle size:  25 G  Medications:  40 mg triamcinolone acetonide 40 mg/mL  Patient tolerance:  Patient tolerated the procedure well with no immediate complications  Small Joint Aspiration/Injection: L thumb CMC    Date/Time: 12/11/2024 1:30 PM    Performed by: Tay Francisco PA  Authorized by: Tay Francisco PA    Consent Done?:  Yes (Verbal)  Indications:  Pain  Site marked: the procedure site was marked    Timeout: prior to procedure the correct patient, procedure, and site was verified    Prep: patient was prepped and draped in usual sterile fashion      Local anesthesia used?: Yes    Local anesthetic:  Lidocaine 1% without epinephrine  Location:  Thumb  Site:  L thumb CMC  Ultrasonic guidance for needle placement?: No    Needle size:  25 G  Medications:  40 mg triamcinolone acetonide 40 mg/mL  Patient tolerance:  Patient tolerated the procedure well with no immediate complications

## 2024-12-11 NOTE — PROGRESS NOTES
[facility]  [address]  [place] [address]  Phone: [phone]   [phone]    Patient's PCP: Maliha Monson NP  Referring Provider: No ref. provider found    Subjective:      Chief Complaint:   Chief Complaint   Patient presents with    Right Hand - Pain     Bilat thumb, last injection was right thumb 08/12/2024 worked good for about 3-4months and would like more    Left Hand - Pain       Past Medical History:   Diagnosis Date    Cancer     bladder CA s/p TURBT    GERD (gastroesophageal reflux disease)     Hematuria 2021    Hyperlipidemia        Past Surgical History:   Procedure Laterality Date    APPENDECTOMY      COLONOSCOPY      CYSTOGRAM  2021    CYSTOSCOPY N/A 05/06/2021    Procedure: CYSTOSCOPY;  Surgeon: Yao Duffy MD;  Location: [facility];  Service: Urology;  Laterality: N/A;    ESOPHAGOGASTRODUODENOSCOPY N/A 03/21/2022    Procedure: EGD (ESOPHAGOGASTRODUODENOSCOPY);  Surgeon: Saurav Hartley MD;  Location: [facility];  Service: Endoscopy;  Laterality: N/A;    LUMBAR FUSION  2015    ROTATOR CUFF REPAIR Left 11/2022    TONSILLECTOMY      UPPER GASTROINTESTINAL ENDOSCOPY         Current Outpatient Medications   Medication Sig    atorvastatin (LIPITOR) 40 MG tablet Take 1 tablet (40 mg total) by mouth every evening.    cetirizine (ZYRTEC) 10 MG tablet Take 1 tablet (10 mg total) by mouth once daily.    colchicine (COLCRYS) 0.6 mg tablet Take 2 tablets today then 1 tablet BID for gout pain- continue until pain free for 24 hours then may stop    indomethacin (INDOCIN SR) 75 mg CpSR CR capsule Take 1 capsule (75 mg total) by mouth 2 (two) times daily as needed (gout pain). Take with food    irbesartan (AVAPRO) 150 MG tablet Take 150 mg by mouth every evening.    melatonin 10 mg Cap Take by mouth.    meloxicam (MOBIC) 15 MG tablet Take 1 tablet (15 mg total) by mouth daily as needed for Pain. Fill 90 tablets    pantoprazole (PROTONIX) 20 MG tablet 20 mg.     ALPRAZolam (XANAX) 0.25 MG tablet Take 1 tablet (0.25 mg total) by mouth 2 (two) times daily as needed for Anxiety.     Current Facility-Administered Medications   Medication    acetaminophen tablet 650 mg    ondansetron disintegrating tablet 4 mg    triamcinolone acetonide injection 40 mg    triamcinolone acetonide injection 40 mg       Review of patient's allergies indicates:  No Known Allergies    Family History   Problem Relation Name Age of Onset    Heart disease Mother      Cancer Mother  70        colon, breast    Diabetes Mother      Colon cancer Mother      COPD Father      Cancer Father  65        prostate    Cancer Brother  58        prostate CA    Heart disease Brother          CHF    Stomach cancer Neg Hx      Rectal cancer Neg Hx      Colon polyps Neg Hx      Crohn's disease Neg Hx      Ulcerative colitis Neg Hx         Social History     Socioeconomic History    Marital status:    Occupational History     Employer: [employer]   Tobacco Use    Smoking status: Former     Current packs/day: 0.00     Average packs/day: 2.0 packs/day for 20.0 years (40.0 ttl pk-yrs)     Types: Cigarettes     Start date: 1973     Quit date: 1993     Years since quittin.6     Passive exposure: Past    Smokeless tobacco: Never   Substance and Sexual Activity    Alcohol use: Yes     Alcohol/week: 6.0 standard drinks of alcohol     Types: 6 Cans of beer per week     Comment: SOCIALLY     Drug use: No    Sexual activity: Yes     Social Drivers of Health     Financial Resource Strain: Low Risk  (2023)    Received from [facility]    Overall Financial Resource Strain (CARDIA)     Difficulty of Paying Living Expenses: Not hard at all   Food Insecurity: No Food Insecurity (2023)    Received from [facility]    Hunger Vital Sign     Worried About Running Out of Food in the Last Year: Never true     Ran Out of Food in the Last Year: Never true   Transportation Needs: No  Transportation Needs (7/24/2023)    Received from [facility]    PRAPARE - Transportation     Lack of Transportation (Medical): No     Lack of Transportation (Non-Medical): No   Physical Activity: Inactive (7/24/2023)    Received from [facility]    Exercise Vital Sign     Days of Exercise per Week: 0 days     Minutes of Exercise per Session: 0 min   Stress: Stress Concern Present (7/24/2023)    Received from [facility]    [employer] [place] [employer] - Occupational Stress Questionnaire     Feeling of Stress : To some extent   Housing Stability: Low Risk  (7/24/2023)    Received from [facility]    Housing Stability Vital Sign     Unable to Pay for Housing in the Last Year: No     Number of Places Lived in the Last Year: 1     Unstable Housing in the Last Year: No       Prior to meeting with the patient I reviewed the medical chart in [employer]. This included reviewing the previous progress notes from our office, review of the patient's last appointment with their primary care provider, review of any visits to the emergency room, and review of any pain management appointments or procedures.    History of present illness: 64 y.o. male, patient returns to clinic today for the bilateral hands, specifically the right greater than left thumb. He is having pain around the basilar joint, we gave him an injection few months ago. This helped a lot. He is interested in repeat injection.        Review of Systems:    Constitutional: Negative for chills, fever and weight loss.   HENT: Negative for congestion.    Eyes: Negative for discharge and redness.   Respiratory: Negative for cough and shortness of breath.    Cardiovascular: Negative for chest pain.   Gastrointestinal: Negative for nausea and vomiting.   Musculoskeletal: See HPI.   Skin: Negative for rash.   Neurological: Negative for headaches.   Endo/Heme/Allergies: Does not bruise/bleed easily.    Psychiatric/Behavioral: The patient is not nervous/anxious.    All other systems reviewed and are negative.       Objective:      Physical Examination:    Vital Signs:  There were no vitals filed for this visit.    Body mass index is 30.91 kg/m².    This a well-developed, well nourished patient in no acute distress.  They are alert and oriented and cooperative to examination.       Examination of the bilateral hands, skin is dry and intact, no erythema or ecchymosis, no signs symptoms of infection.  Range of motion is full throughout the wrist hand and fingers.  He can make a fist, he can oppose the thumb in the little finger.  Cap refills less than 3 seconds.  Distally neurovascularly intact.  He is got pain at the basilar joint, he gets pain and crepitus with CMC grind testing.       Pertinent New Results:          XRAY Report / Interpretation:             Assessment:       1. Arthritis of carpometacarpal (CMC) joint of right thumb    2. Arthritis of carpometacarpal (CMC) joint of left thumb      Plan:     Arthritis of carpometacarpal (CMC) joint of right thumb  -     Small Joint Aspiration/Injection: R thumb CMC  -     triamcinolone acetonide injection 40 mg    Arthritis of carpometacarpal (CMC) joint of left thumb  -     Small Joint Aspiration/Injection: L thumb CMC  -     triamcinolone acetonide injection 40 mg        Follow up if symptoms worsen or fail to improve.    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  We discussed interpositional arthroplasty again today in great detail.  He will follow up with us on an as-needed basis.    The patient and I had a thorough discussion today.  We discussed the working diagnosis as well as several other potential alternative diagnoses.  We discussed treatment options, both conservative and surgical.  Conservative treatment options would include things such as activity modifications, workplace  modifications, a period of rest, oral versus topical over the counter and oral versus topical prescription anti-inflammatory medications, physical therapy / occupational therapy, splinting / bracing, immobilization, corticosteroid injections, and others.        IGNACIO Carrillo, PA-C        EMR Statement:  Please note that portions of this patient encounter record were imported from the patients electronic medical record and that others were dictated using voice recognition software. For these reasons grammatical errors, nonsensical language, and apparently contradictory statements may be present.  These should be disregarded or interpreted with respect to the context of the document.

## 2025-02-05 DIAGNOSIS — R97.20 ELEVATED PROSTATE SPECIFIC ANTIGEN (PSA): Primary | ICD-10-CM

## 2025-04-07 ENCOUNTER — OFFICE VISIT (OUTPATIENT)
Dept: ORTHOPEDICS | Facility: CLINIC | Age: 65
End: 2025-04-07
Payer: COMMERCIAL

## 2025-04-07 VITALS
BODY MASS INDEX: 29.52 KG/M2 | HEIGHT: 74 IN | SYSTOLIC BLOOD PRESSURE: 140 MMHG | WEIGHT: 230 LBS | DIASTOLIC BLOOD PRESSURE: 80 MMHG

## 2025-04-07 DIAGNOSIS — M18.12 ARTHRITIS OF CARPOMETACARPAL (CMC) JOINT OF LEFT THUMB: ICD-10-CM

## 2025-04-07 DIAGNOSIS — M18.11 ARTHRITIS OF CARPOMETACARPAL (CMC) JOINT OF RIGHT THUMB: Primary | ICD-10-CM

## 2025-04-07 PROCEDURE — 99999 PR PBB SHADOW E&M-EST. PATIENT-LVL III: CPT | Mod: PBBFAC,,, | Performed by: PHYSICIAN ASSISTANT

## 2025-04-07 PROCEDURE — 1101F PT FALLS ASSESS-DOCD LE1/YR: CPT | Mod: CPTII,S$GLB,, | Performed by: PHYSICIAN ASSISTANT

## 2025-04-07 PROCEDURE — 1159F MED LIST DOCD IN RCRD: CPT | Mod: CPTII,S$GLB,, | Performed by: PHYSICIAN ASSISTANT

## 2025-04-07 PROCEDURE — 3079F DIAST BP 80-89 MM HG: CPT | Mod: CPTII,S$GLB,, | Performed by: PHYSICIAN ASSISTANT

## 2025-04-07 PROCEDURE — 3288F FALL RISK ASSESSMENT DOCD: CPT | Mod: CPTII,S$GLB,, | Performed by: PHYSICIAN ASSISTANT

## 2025-04-07 PROCEDURE — 1125F AMNT PAIN NOTED PAIN PRSNT: CPT | Mod: CPTII,S$GLB,, | Performed by: PHYSICIAN ASSISTANT

## 2025-04-07 PROCEDURE — 3008F BODY MASS INDEX DOCD: CPT | Mod: CPTII,S$GLB,, | Performed by: PHYSICIAN ASSISTANT

## 2025-04-07 PROCEDURE — 1160F RVW MEDS BY RX/DR IN RCRD: CPT | Mod: CPTII,S$GLB,, | Performed by: PHYSICIAN ASSISTANT

## 2025-04-07 PROCEDURE — 4010F ACE/ARB THERAPY RXD/TAKEN: CPT | Mod: CPTII,S$GLB,, | Performed by: PHYSICIAN ASSISTANT

## 2025-04-07 PROCEDURE — 99213 OFFICE O/P EST LOW 20 MIN: CPT | Mod: 25,S$GLB,, | Performed by: PHYSICIAN ASSISTANT

## 2025-04-07 PROCEDURE — 20600 DRAIN/INJ JOINT/BURSA W/O US: CPT | Mod: 50,S$GLB,, | Performed by: PHYSICIAN ASSISTANT

## 2025-04-07 PROCEDURE — 3077F SYST BP >= 140 MM HG: CPT | Mod: CPTII,S$GLB,, | Performed by: PHYSICIAN ASSISTANT

## 2025-04-07 RX ORDER — TRIAMCINOLONE ACETONIDE 40 MG/ML
40 INJECTION, SUSPENSION INTRA-ARTICULAR; INTRAMUSCULAR
Status: DISCONTINUED | OUTPATIENT
Start: 2025-04-07 | End: 2025-04-07 | Stop reason: HOSPADM

## 2025-04-07 RX ADMIN — TRIAMCINOLONE ACETONIDE 40 MG: 40 INJECTION, SUSPENSION INTRA-ARTICULAR; INTRAMUSCULAR at 10:04

## 2025-04-07 NOTE — PROGRESS NOTES
[facility] ORTHOPEDICS  [address]  [place] [address]  Phone: [phone]   [phone]    Patient's PCP: Maliha Monson NP  Referring Provider: No ref. provider found    Subjective:      Chief Complaint:   Chief Complaint   Patient presents with    Left Hand - Pain     Bilateral CMC Thumb pain, last injected 12/11/24, right is worse than left, injections seem to help for few months, would like to discuss the surgery,     Right Hand - Pain       Past Medical History:   Diagnosis Date    Cancer     bladder CA s/p TURBT    GERD (gastroesophageal reflux disease)     Hematuria 2021    Hyperlipidemia        Past Surgical History:   Procedure Laterality Date    APPENDECTOMY      COLONOSCOPY      CYSTOGRAM  2021    CYSTOSCOPY N/A 05/06/2021    Procedure: CYSTOSCOPY;  Surgeon: Yao Duffy MD;  Location: [facility];  Service: Urology;  Laterality: N/A;    ESOPHAGOGASTRODUODENOSCOPY N/A 03/21/2022    Procedure: EGD (ESOPHAGOGASTRODUODENOSCOPY);  Surgeon: Saurav Hartley MD;  Location: [facility];  Service: Endoscopy;  Laterality: N/A;    LUMBAR FUSION  2015    ROTATOR CUFF REPAIR Left 11/2022    TONSILLECTOMY      UPPER GASTROINTESTINAL ENDOSCOPY         Current Outpatient Medications   Medication Sig    atorvastatin (LIPITOR) 40 MG tablet Take 1 tablet (40 mg total) by mouth every evening.    cetirizine (ZYRTEC) 10 MG tablet Take 1 tablet (10 mg total) by mouth once daily.    irbesartan (AVAPRO) 150 MG tablet Take 150 mg by mouth every evening.    melatonin 10 mg Cap Take by mouth.    meloxicam (MOBIC) 15 MG tablet Take 1 tablet (15 mg total) by mouth daily as needed for Pain. Fill 90 tablets    pantoprazole (PROTONIX) 20 MG tablet 20 mg.    ALPRAZolam (XANAX) 0.25 MG tablet Take 1 tablet (0.25 mg total) by mouth 2 (two) times daily as needed for Anxiety.    colchicine (COLCRYS) 0.6 mg tablet Take 2 tablets today then 1 tablet BID for gout pain- continue until pain free for 24 hours then may  stop    indomethacin (INDOCIN SR) 75 mg CpSR CR capsule Take 1 capsule (75 mg total) by mouth 2 (two) times daily as needed (gout pain). Take with food     Current Facility-Administered Medications   Medication    acetaminophen tablet 650 mg    ondansetron disintegrating tablet 4 mg       Review of patient's allergies indicates:  No Known Allergies    Family History   Problem Relation Name Age of Onset    Heart disease Mother      Cancer Mother  70        colon, breast    Diabetes Mother      Colon cancer Mother      COPD Father      Cancer Father  65        prostate    Cancer Brother  58        prostate CA    Heart disease Brother          CHF    Stomach cancer Neg Hx      Rectal cancer Neg Hx      Colon polyps Neg Hx      Crohn's disease Neg Hx      Ulcerative colitis Neg Hx         Social History[1]    Prior to meeting with the patient I reviewed the medical chart in [facility]. This included reviewing the previous progress notes from our office, review of the patient's last appointment with their primary care provider, review of any visits to the emergency room, and review of any pain management appointments or procedures.    History of present illness: 64 y.o. male,  returns to clinic today for the bilateral thumbs.  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  It has been 3 months since we injected him last.  He is contemplating halfway and is interested in surgery.       Review of Systems:    Constitutional: Negative for chills, fever and weight loss.   HENT: Negative for congestion.    Eyes: Negative for discharge and redness.   Respiratory: Negative for cough and shortness of breath.    Cardiovascular: Negative for chest pain.   Gastrointestinal: Negative for nausea and vomiting.   Musculoskeletal: See HPI.   Skin: Negative for rash.   Neurological: Negative for headaches.   Endo/Heme/Allergies: Does not bruise/bleed easily.    Psychiatric/Behavioral: The patient is not nervous/anxious.    All other systems reviewed and are negative.       Objective:      Physical Examination:    Vital Signs:    Vitals:    04/07/25 1012   BP: (!) 140/80       Body mass index is 29.53 kg/m².    This a well-developed, well nourished patient in no acute distress.  They are alert and oriented and cooperative to examination.     Examination of the bilateral hands, skin is dry and intact, erythema or ecchymosis, no signs symptoms of infection.  Range motion pain in the basilar joints but he can make a fist he can oppose all fingers.  Otherwise normal range motion in the wrist hand fingers.  Distally neurovascularly intact.      Pertinent New Results:        XRAY Report / Interpretation:       Procedure/s:  Small Joint Aspiration/Injection: R thumb CMC    Date/Time: 4/7/2025 10:30 AM    Performed by: Tay Francisco PA  Authorized by: Tay Francisco PA    Consent Done?:  Yes (Verbal)  Indications:  Pain  Site marked: the procedure site was marked    Timeout: prior to procedure the correct patient, procedure, and site was verified    Prep: patient was prepped and draped in usual sterile fashion      Local anesthesia used?: Yes    Local anesthetic:  Lidocaine 1% without epinephrine  Location:  Thumb  Site:  R thumb CMC  Ultrasonic guidance for needle placement?: No    Needle size:  25 G  Medications:  40 mg triamcinolone acetonide 40 mg/mL  Patient tolerance:  Patient tolerated the procedure well with no immediate complications  Small Joint Aspiration/Injection: L thumb CMC    Date/Time: 4/7/2025 10:30 AM    Performed by: Tay Francisco PA  Authorized by: Tay Francisco PA    Consent Done?:  Yes (Verbal)  Indications:  Pain  Site marked: the procedure site was marked    Timeout: prior to procedure the correct patient, procedure, and site was verified    Prep: patient was prepped and draped in usual sterile fashion      Local anesthesia  used?: Yes    Local anesthetic:  Lidocaine 1% without epinephrine  Location:  Thumb  Site:  L thumb CMC  Ultrasonic guidance for needle placement?: No    Needle size:  25 G  Medications:  40 mg triamcinolone acetonide 40 mg/mL  Patient tolerance:  Patient tolerated the procedure well with no immediate complications           Assessment:       1. Arthritis of carpometacarpal (CMC) joint of right thumb    2. Arthritis of carpometacarpal (CMC) joint of left thumb      Plan:     Arthritis of carpometacarpal (CMC) joint of right thumb  -     Small Joint Aspiration/Injection: R thumb CMC    Arthritis of carpometacarpal (CMC) joint of left thumb  -     Small Joint Aspiration/Injection: L thumb CMC        Follow up in about 3 months (around 7/7/2025) for bilat cmc inj 4/7/25.    Bilateral basilar arthritis, we spent a great deal of time discussing interpositional arthroplasty, the expected preoperative intraoperative and postoperative courses associated with the surgery.  He is interested in proceeding but requested repeat injections today.  He anticipates within the next 6-12 months he would like to proceed with the initial right thumb surgery.  Up with us on an as-needed basis.    The patient and I had a thorough discussion today.  We discussed the working diagnosis as well as several other potential alternative diagnoses.  We discussed treatment options, both conservative and surgical.  Conservative treatment options would include things such as activity modifications, workplace modifications, a period of rest, oral versus topical over the counter and oral versus topical prescription anti-inflammatory medications, physical therapy / occupational therapy, splinting / bracing, immobilization, corticosteroid injections, and others.        IGNACIO Carrillo, PAFlynnC        EMR Statement:  Please note that portions of this patient encounter record were imported from the patients electronic medical record and that others were  dictated using voice recognition software. For these reasons grammatical errors, nonsensical language, and apparently contradictory statements may be present.  These should be disregarded or interpreted with respect to the context of the document.       [1]   Social History  Socioeconomic History    Marital status:    Occupational History     Employer: [employer]   Tobacco Use    Smoking status: Former     Current packs/day: 0.00     Average packs/day: 2.0 packs/day for 20.0 years (40.0 ttl pk-yrs)     Types: Cigarettes     Start date: 1973     Quit date: 1993     Years since quittin.9     Passive exposure: Past    Smokeless tobacco: Never   Substance and Sexual Activity    Alcohol use: Yes     Alcohol/week: 6.0 standard drinks of alcohol     Types: 6 Cans of beer per week     Comment: SOCIALLY     Drug use: No    Sexual activity: Yes     Social Drivers of Health     Financial Resource Strain: Low Risk  (3/7/2025)    Received from [facility]    Overall Financial Resource Strain (CARDIA)     Difficulty of Paying Living Expenses: Not hard at all   Food Insecurity: No Food Insecurity (3/7/2025)    Received from [facility]    Hunger Vital Sign     Worried About Running Out of Food in the Last Year: Never true     Ran Out of Food in the Last Year: Never true   Transportation Needs: No Transportation Needs (3/7/2025)    Received from [facility]    PRAPARE - Transportation     Lack of Transportation (Medical): No     Lack of Transportation (Non-Medical): No   Physical Activity: Sufficiently Active (3/7/2025)    Received from [facility]    Exercise Vital Sign     Days of Exercise per Week: 3 days     Minutes of Exercise per Session: 60 min   Stress: No Stress Concern Present (3/7/2025)    Received from [facility]    [place] [employer] - Occupational Stress Questionnaire     Feeling of Stress : Not at all   Housing Stability: Low Risk  (2023)    Received from [facility]    Housing Stability Vital Sign     Unable to Pay for Housing in the Last Year: No     Number of Places Lived in the Last Year: 1     Unstable Housing in the Last Year: No

## 2025-04-07 NOTE — PROCEDURES
Small Joint Aspiration/Injection: R thumb CMC    Date/Time: 4/7/2025 10:30 AM    Performed by: Tay Francisco PA  Authorized by: Tay Francisco PA    Consent Done?:  Yes (Verbal)  Indications:  Pain  Site marked: the procedure site was marked    Timeout: prior to procedure the correct patient, procedure, and site was verified    Prep: patient was prepped and draped in usual sterile fashion      Local anesthesia used?: Yes    Local anesthetic:  Lidocaine 1% without epinephrine  Location:  Thumb  Site:  R thumb CMC  Ultrasonic guidance for needle placement?: No    Needle size:  25 G  Medications:  40 mg triamcinolone acetonide 40 mg/mL  Patient tolerance:  Patient tolerated the procedure well with no immediate complications  Small Joint Aspiration/Injection: L thumb CMC    Date/Time: 4/7/2025 10:30 AM    Performed by: Tay Francisco PA  Authorized by: Tay Francisco PA    Consent Done?:  Yes (Verbal)  Indications:  Pain  Site marked: the procedure site was marked    Timeout: prior to procedure the correct patient, procedure, and site was verified    Prep: patient was prepped and draped in usual sterile fashion      Local anesthesia used?: Yes    Local anesthetic:  Lidocaine 1% without epinephrine  Location:  Thumb  Site:  L thumb CMC  Ultrasonic guidance for needle placement?: No    Needle size:  25 G  Medications:  40 mg triamcinolone acetonide 40 mg/mL  Patient tolerance:  Patient tolerated the procedure well with no immediate complications

## 2025-04-17 ENCOUNTER — TELEPHONE (OUTPATIENT)
Dept: ORTHOPEDICS | Facility: CLINIC | Age: 65
End: 2025-04-17
Payer: COMMERCIAL

## 2025-05-05 ENCOUNTER — OFFICE VISIT (OUTPATIENT)
Dept: OTOLARYNGOLOGY | Facility: CLINIC | Age: 65
End: 2025-05-05
Payer: COMMERCIAL

## 2025-05-05 VITALS — BODY MASS INDEX: 30.03 KG/M2 | WEIGHT: 233.94 LBS

## 2025-05-05 DIAGNOSIS — T16.1XXA FOREIGN BODY OF RIGHT EAR, INITIAL ENCOUNTER: ICD-10-CM

## 2025-05-05 DIAGNOSIS — L29.9 ITCHING OF EAR: Primary | ICD-10-CM

## 2025-05-05 DIAGNOSIS — H92.02 REFERRED OTALGIA OF LEFT EAR: ICD-10-CM

## 2025-05-05 PROCEDURE — 1101F PT FALLS ASSESS-DOCD LE1/YR: CPT | Mod: CPTII,S$GLB,, | Performed by: NURSE PRACTITIONER

## 2025-05-05 PROCEDURE — 3008F BODY MASS INDEX DOCD: CPT | Mod: CPTII,S$GLB,, | Performed by: NURSE PRACTITIONER

## 2025-05-05 PROCEDURE — 99203 OFFICE O/P NEW LOW 30 MIN: CPT | Mod: 25,S$GLB,, | Performed by: NURSE PRACTITIONER

## 2025-05-05 PROCEDURE — 4010F ACE/ARB THERAPY RXD/TAKEN: CPT | Mod: CPTII,S$GLB,, | Performed by: NURSE PRACTITIONER

## 2025-05-05 PROCEDURE — 1125F AMNT PAIN NOTED PAIN PRSNT: CPT | Mod: CPTII,S$GLB,, | Performed by: NURSE PRACTITIONER

## 2025-05-05 PROCEDURE — 3288F FALL RISK ASSESSMENT DOCD: CPT | Mod: CPTII,S$GLB,, | Performed by: NURSE PRACTITIONER

## 2025-05-05 PROCEDURE — 69200 CLEAR OUTER EAR CANAL: CPT | Mod: RT,S$GLB,, | Performed by: NURSE PRACTITIONER

## 2025-05-05 PROCEDURE — 99999 PR PBB SHADOW E&M-EST. PATIENT-LVL III: CPT | Mod: PBBFAC,,, | Performed by: NURSE PRACTITIONER

## 2025-05-05 PROCEDURE — 1160F RVW MEDS BY RX/DR IN RCRD: CPT | Mod: CPTII,S$GLB,, | Performed by: NURSE PRACTITIONER

## 2025-05-05 PROCEDURE — 1159F MED LIST DOCD IN RCRD: CPT | Mod: CPTII,S$GLB,, | Performed by: NURSE PRACTITIONER

## 2025-05-05 RX ORDER — HYDROCORTISONE AND ACETIC ACID 20.75; 10.375 MG/ML; MG/ML
4 SOLUTION AURICULAR (OTIC) 2 TIMES DAILY
Qty: 10 ML | Refills: 0 | Status: SHIPPED | OUTPATIENT
Start: 2025-05-05 | End: 2025-05-19

## 2025-05-05 NOTE — PROGRESS NOTES
Subjective     Patient ID: Jose Singh is a 64 y.o. male.    Chief Complaint: Otalgia (Pt states he is having some L ear pain, hurts to touch and itchy)    Otalgia     Patient is new to ENT, self-referred for otalgia. Patient reports left otalgia X 2 days. First started off as itchy, now tenderness when he presses in front of his left ear and feels a pressure in his left ear. No impaired hearing. No drainage. No swelling.       Review of Systems   Constitutional: Negative.    HENT:  Positive for ear pain.    Eyes: Negative.    Respiratory: Negative.     Cardiovascular: Negative.    Gastrointestinal: Negative.    Musculoskeletal: Negative.    Integumentary:  Negative.   Neurological: Negative.    Hematological: Negative.    Psychiatric/Behavioral: Negative.            Objective     Physical Exam  Vitals and nursing note reviewed.   Constitutional:       General: He is not in acute distress.     Appearance: He is well-developed. He is not ill-appearing.   HENT:      Head: Normocephalic and atraumatic.      Right Ear: Hearing, tympanic membrane, ear canal and external ear normal. No middle ear effusion. Tympanic membrane is not erythematous.      Left Ear: Hearing, tympanic membrane, ear canal and external ear normal.  No middle ear effusion. Tympanic membrane is not erythematous.      Nose: Nose normal.   Eyes:      General: Lids are normal. No scleral icterus.        Right eye: No discharge.         Left eye: No discharge.   Neck:      Trachea: Trachea normal. No tracheal deviation.   Cardiovascular:      Rate and Rhythm: Normal rate.   Pulmonary:      Effort: Pulmonary effort is normal. No respiratory distress.      Breath sounds: No stridor. No wheezing.   Musculoskeletal:         General: Normal range of motion.      Cervical back: Normal range of motion.   Skin:     General: Skin is warm and dry.   Neurological:      Mental Status: He is alert and oriented to person, place, and time.      Coordination:  Coordination is intact.      Gait: Gait normal.   Psychiatric:         Attention and Perception: Attention normal.         Mood and Affect: Mood normal.         Speech: Speech normal.         Behavior: Behavior normal. Behavior is cooperative.     SEPARATE PROCEDURE IN OFFICE:   Procedure: Removal of foreign body, RIGHT  Pre Procedure Diagnosis: Foreign body of ear canal  Post Procedure Diagnosis: Foreign body of ear canal  Verbal informed consent in regards to risk of trauma to ear canal, ear drum or hearing, discomfort during procedure and/or inability to remove cerumen impaction in one session or unforeseen events or complications.   No anesthesia.     Procedure in detail:   Ear canal visualized bilateral with appropriate size ear speculum utilizing Operating Head Binocular Otomicroscope   Utilizing the following: delicate alligator forceps used. Long cylindrical hair-like structure was removed atraumatically. The TM and EAC were then inspected and found to be clear of wax. See description of TMs/EACs in PE above.   Complications: No   Condition: Improved/Good         Assessment and Plan     1. Itching of ear  Comments:  LEFT  Orders:  -     acetic acid-hydrocortisone (VOSOL-HC) otic solution; Place 4 drops into the left ear 2 (two) times daily. for 14 days  Dispense: 10 mL; Refill: 0    2. Referred otalgia of left ear  Comments:  Likely TMJ-related    3. Foreign body of right ear, initial encounter        Vosol-HC gtts AS BID. Reassured no evidence of acute bacterial or fungal infection.   Discussed possibility of TMJ-related pain AS. Has Mobic and Voltaren gel.   Plan of care was discussed in detail with the patient, who agreed with the plan as above. All questions were answered in detail. Patient encouraged to return to clinic if symptoms worsen/persist and as needed for further ENT symptoms or concerns.              No follow-ups on file.

## 2025-05-09 ENCOUNTER — TELEPHONE (OUTPATIENT)
Dept: GASTROENTEROLOGY | Facility: CLINIC | Age: 65
End: 2025-05-09
Payer: COMMERCIAL

## 2025-05-09 NOTE — TELEPHONE ENCOUNTER
----- Message from Lindsey sent at 5/9/2025  1:35 PM CDT -----  Contact: Patient  Type:  Appointment RequestCaller is requesting a sooner appointment.  Caller declined first available appointment listed below.  Caller will not accept being placed on the waitlist and is requesting a message be sent to doctor.Name of Caller:  PatientWhen is the first available appointment?  N/AWould the patient rather a call back or a response via [facility]?   Call [facility] Call Back Number:  [phone] Information:   States he would like to speak with someone to schedule a colonoscopy - please call - thank you

## 2025-06-04 ENCOUNTER — OFFICE VISIT (OUTPATIENT)
Dept: FAMILY MEDICINE | Facility: CLINIC | Age: 65
End: 2025-06-04
Payer: COMMERCIAL

## 2025-06-04 VITALS
HEIGHT: 74 IN | DIASTOLIC BLOOD PRESSURE: 64 MMHG | SYSTOLIC BLOOD PRESSURE: 132 MMHG | HEART RATE: 84 BPM | WEIGHT: 235.19 LBS | BODY MASS INDEX: 30.18 KG/M2 | OXYGEN SATURATION: 97 %

## 2025-06-04 DIAGNOSIS — I10 PRIMARY HYPERTENSION: ICD-10-CM

## 2025-06-04 DIAGNOSIS — E78.5 DYSLIPIDEMIA: ICD-10-CM

## 2025-06-04 DIAGNOSIS — Z85.51 HISTORY OF BLADDER CANCER: ICD-10-CM

## 2025-06-04 DIAGNOSIS — R73.01 IFG (IMPAIRED FASTING GLUCOSE): ICD-10-CM

## 2025-06-04 DIAGNOSIS — M15.0 PRIMARY OSTEOARTHRITIS INVOLVING MULTIPLE JOINTS: Primary | ICD-10-CM

## 2025-06-04 PROCEDURE — 3008F BODY MASS INDEX DOCD: CPT | Mod: CPTII,S$GLB,, | Performed by: NURSE PRACTITIONER

## 2025-06-04 PROCEDURE — 1159F MED LIST DOCD IN RCRD: CPT | Mod: CPTII,S$GLB,, | Performed by: NURSE PRACTITIONER

## 2025-06-04 PROCEDURE — 3288F FALL RISK ASSESSMENT DOCD: CPT | Mod: CPTII,S$GLB,, | Performed by: NURSE PRACTITIONER

## 2025-06-04 PROCEDURE — 99214 OFFICE O/P EST MOD 30 MIN: CPT | Mod: S$GLB,,, | Performed by: NURSE PRACTITIONER

## 2025-06-04 PROCEDURE — 4010F ACE/ARB THERAPY RXD/TAKEN: CPT | Mod: CPTII,S$GLB,, | Performed by: NURSE PRACTITIONER

## 2025-06-04 PROCEDURE — 1101F PT FALLS ASSESS-DOCD LE1/YR: CPT | Mod: CPTII,S$GLB,, | Performed by: NURSE PRACTITIONER

## 2025-06-04 PROCEDURE — 1160F RVW MEDS BY RX/DR IN RCRD: CPT | Mod: CPTII,S$GLB,, | Performed by: NURSE PRACTITIONER

## 2025-06-04 PROCEDURE — 3078F DIAST BP <80 MM HG: CPT | Mod: CPTII,S$GLB,, | Performed by: NURSE PRACTITIONER

## 2025-06-04 PROCEDURE — 1125F AMNT PAIN NOTED PAIN PRSNT: CPT | Mod: CPTII,S$GLB,, | Performed by: NURSE PRACTITIONER

## 2025-06-04 PROCEDURE — 3075F SYST BP GE 130 - 139MM HG: CPT | Mod: CPTII,S$GLB,, | Performed by: NURSE PRACTITIONER

## 2025-06-04 RX ORDER — MELOXICAM 15 MG/1
15 TABLET ORAL DAILY PRN
Qty: 90 TABLET | Refills: 3 | Status: SHIPPED | OUTPATIENT
Start: 2025-06-04

## 2025-06-04 RX ORDER — ATORVASTATIN CALCIUM 40 MG/1
40 TABLET, FILM COATED ORAL NIGHTLY
Qty: 90 TABLET | Refills: 3 | Status: SHIPPED | OUTPATIENT
Start: 2025-06-04

## 2025-06-09 ENCOUNTER — OFFICE VISIT (OUTPATIENT)
Dept: ORTHOPEDICS | Facility: CLINIC | Age: 65
End: 2025-06-09
Payer: COMMERCIAL

## 2025-06-09 ENCOUNTER — HOSPITAL ENCOUNTER (OUTPATIENT)
Dept: RADIOLOGY | Facility: HOSPITAL | Age: 65
Discharge: HOME | End: 2025-06-09
Payer: COMMERCIAL

## 2025-06-09 VITALS
WEIGHT: 235.25 LBS | SYSTOLIC BLOOD PRESSURE: 122 MMHG | HEIGHT: 74 IN | DIASTOLIC BLOOD PRESSURE: 80 MMHG | BODY MASS INDEX: 30.19 KG/M2

## 2025-06-09 DIAGNOSIS — M70.62 GREATER TROCHANTERIC BURSITIS, LEFT: Primary | ICD-10-CM

## 2025-06-09 DIAGNOSIS — M17.12 PRIMARY OSTEOARTHRITIS OF LEFT KNEE: ICD-10-CM

## 2025-06-09 PROCEDURE — 1159F MED LIST DOCD IN RCRD: CPT | Mod: CPTII,S$GLB,,

## 2025-06-09 PROCEDURE — 3074F SYST BP LT 130 MM HG: CPT | Mod: CPTII,S$GLB,,

## 2025-06-09 PROCEDURE — 3008F BODY MASS INDEX DOCD: CPT | Mod: CPTII,S$GLB,,

## 2025-06-09 PROCEDURE — 99214 OFFICE O/P EST MOD 30 MIN: CPT | Mod: 25,S$GLB,,

## 2025-06-09 PROCEDURE — 73501 X-RAY EXAM HIP UNI 1 VIEW: CPT | Mod: TC,PN,LT

## 2025-06-09 PROCEDURE — 73501 X-RAY EXAM HIP UNI 1 VIEW: CPT | Mod: 26,LT,, | Performed by: RADIOLOGY

## 2025-06-09 PROCEDURE — 1125F AMNT PAIN NOTED PAIN PRSNT: CPT | Mod: CPTII,S$GLB,,

## 2025-06-09 PROCEDURE — 73562 X-RAY EXAM OF KNEE 3: CPT | Mod: 26,LT,, | Performed by: RADIOLOGY

## 2025-06-09 PROCEDURE — 20610 DRAIN/INJ JOINT/BURSA W/O US: CPT | Mod: 51,XS,LT,S$GLB

## 2025-06-09 PROCEDURE — 1101F PT FALLS ASSESS-DOCD LE1/YR: CPT | Mod: CPTII,S$GLB,,

## 2025-06-09 PROCEDURE — 73562 X-RAY EXAM OF KNEE 3: CPT | Mod: TC,PN,LT

## 2025-06-09 PROCEDURE — 3079F DIAST BP 80-89 MM HG: CPT | Mod: CPTII,S$GLB,,

## 2025-06-09 PROCEDURE — 20610 DRAIN/INJ JOINT/BURSA W/O US: CPT | Mod: LT,S$GLB,,

## 2025-06-09 PROCEDURE — 99999 PR PBB SHADOW E&M-EST. PATIENT-LVL III: CPT | Mod: PBBFAC,,,

## 2025-06-09 PROCEDURE — 3288F FALL RISK ASSESSMENT DOCD: CPT | Mod: CPTII,S$GLB,,

## 2025-06-09 PROCEDURE — 1160F RVW MEDS BY RX/DR IN RCRD: CPT | Mod: CPTII,S$GLB,,

## 2025-06-09 PROCEDURE — 4010F ACE/ARB THERAPY RXD/TAKEN: CPT | Mod: CPTII,S$GLB,,

## 2025-06-09 RX ORDER — TRIAMCINOLONE ACETONIDE 40 MG/ML
40 INJECTION, SUSPENSION INTRA-ARTICULAR; INTRAMUSCULAR
Status: DISCONTINUED | OUTPATIENT
Start: 2025-06-09 | End: 2025-06-09 | Stop reason: HOSPADM

## 2025-06-09 RX ADMIN — TRIAMCINOLONE ACETONIDE 40 MG: 40 INJECTION, SUSPENSION INTRA-ARTICULAR; INTRAMUSCULAR at 08:06

## 2025-06-09 NOTE — PROCEDURES
Large Joint Aspiration/Injection: L knee    Date/Time: 6/9/2025 8:30 AM    Performed by: Ankush Reddy PA-C  Authorized by: Ankush Reddy PA-C    Consent Done?:  Yes (Verbal)  Indications:  Pain  Timeout: prior to procedure the correct patient, procedure, and site was verified    Prep: patient was prepped and draped in usual sterile fashion      Local anesthesia used?: Yes    Local anesthetic:  Lidocaine 1% without epinephrine    Details:  Needle Size:  22 G  Ultrasonic Guidance for needle placement?: No    Approach:  Anterolateral  Location:  Knee  Site:  L knee  Medications:  40 mg triamcinolone acetonide 40 mg/mL  Patient tolerance:  Patient tolerated the procedure well with no immediate complications  Large Joint Aspiration/Injection: L greater trochanteric bursa    Date/Time: 6/9/2025 8:30 AM    Performed by: Ankush Reddy PA-C  Authorized by: Ankush Reddy PA-C    Indications:  Pain  Timeout: prior to procedure the correct patient, procedure, and site was verified    Prep: patient was prepped and draped in usual sterile fashion      Local anesthesia used?: Yes    Local anesthetic:  Lidocaine 1% without epinephrine    Details:  Needle Size:  22 G  Approach:  Lateral  Location:  Hip  Site:  L greater trochanteric bursa  Medications:  40 mg triamcinolone acetonide 40 mg/mL  Patient tolerance:  Patient tolerated the procedure well with no immediate complications

## 2025-06-09 NOTE — PROGRESS NOTES
[facility]  [address]  [place] [address]  Phone: [phone]   [phone]    Patient's PCP: Maliha Monson NP  Referring Provider: No ref. provider found    Subjective:     Chief Complaint:   Chief Complaint   Patient presents with    Left Knee - Pain     Left knee pain x 1 month. Denies any specific injury, however has started walking more for exercise. Endorses swelling, limited and painful ROM. Unable to squat down on L side. Endorses crepitus. Denies locking up and  giving way. Describes pain as throbbing. Pain posteriorly>anteriorly.     Left Hip - Pain     Left hip pain x 2-3 years. First began after a roof caved in and he fell on his L side. Pain has increased over the past 3 months. Pain is located laterally. Describes pain as aching. Pain is only when moving.        Past Medical History:   Diagnosis Date    Cancer     bladder CA s/p TURBT    GERD (gastroesophageal reflux disease)     Hematuria 2021    Hyperlipidemia        Past Surgical History:   Procedure Laterality Date    APPENDECTOMY      COLONOSCOPY      CYSTOGRAM  2021    CYSTOSCOPY N/A 05/06/2021    Procedure: CYSTOSCOPY;  Surgeon: Yao Duffy MD;  Location: [facility];  Service: Urology;  Laterality: N/A;    ESOPHAGOGASTRODUODENOSCOPY N/A 03/21/2022    Procedure: EGD (ESOPHAGOGASTRODUODENOSCOPY);  Surgeon: Saurav Hartley MD;  Location: [facility];  Service: Endoscopy;  Laterality: N/A;    LUMBAR FUSION  2015    ROTATOR CUFF REPAIR Left 11/2022    SPINE SURGERY  2014    TONSILLECTOMY      UPPER GASTROINTESTINAL ENDOSCOPY         Current Outpatient Medications   Medication Sig    atorvastatin (LIPITOR) 40 MG tablet Take 1 tablet (40 mg total) by mouth every evening.    irbesartan (AVAPRO) 150 MG tablet Take 150 mg by mouth every evening.    melatonin 10 mg Cap Take by mouth.    meloxicam (MOBIC) 15 MG tablet Take 1 tablet (15 mg total) by mouth daily as needed for Pain. Fill 90 tablets    pantoprazole  (PROTONIX) 20 MG tablet 20 mg.     Current Facility-Administered Medications   Medication    acetaminophen tablet 650 mg    ondansetron disintegrating tablet 4 mg       Review of patient's allergies indicates:  No Known Allergies    Family History   Problem Relation Name Age of Onset    Heart disease Mother miles     Cancer Mother miles 70        colon, breast    Diabetes Mother miles     Colon cancer Mother miles     COPD Father rebecca     Cancer Father rebecca 65        prostate    Arthritis Father rebecca     Cancer Brother rebecca 58        prostate CA    Heart disease Brother rebecca         CHF    Stomach cancer Neg Hx      Rectal cancer Neg Hx      Colon polyps Neg Hx      Crohn's disease Neg Hx      Ulcerative colitis Neg Hx         Social History[1]    Prior to meeting with the patient I reviewed the medical chart in [facility]. This included reviewing the previous progress notes from our office, review of the patient's last appointment with their primary care provider, review of any visits to the emergency room, and review of any pain management appointments or procedures.    History of present illness: 64 y.o. male  presenting with left knee pain has been present for about 1 month.  He localizes the majority of his pain to the posterior aspect of his left knee.  He states that the pain is intermittent and increases in severity with prolonged ambulation increased activity.  He denies any specific injury or trauma which caused the pain to arise.  He states that he has started walking more for exercise which may have initiated the left knee pain.  He admits to limited and painful range of motion of the left knee.  He admits to grinding in the left knee with flexion-extension.  He denies any feeling of the left knee wanting to lock up or give way on him.  He also presents with chronic left hip pain has been present for 2 or 3 years.  He localizes his left hip pain to the lateral aspect of his left hip.  He denies groin pain.  He  states that the pain began after he fell on his left side a few years ago in the left hip pain has increased significantly over the past few months.  He states that his left hip pain increased in severity with prolonged ambulation increased activity.  He denies any numbness or tingling throughout his left lower extremity.  He states that he does take meloxicam once a day for his inflammatory symptoms which provides him some relief of his pain.       Review of Systems:    Constitutional: Negative for chills, fever and weight loss.  HENT: Negative for congestion.    Eyes: Negative for discharge and redness.  Respiratory: Negative for cough and shortness of breath.    Cardiovascular: Negative for chest pain.  Gastrointestinal: Negative for nausea and vomiting.  Musculoskeletal: See HPI.  Skin: Negative for rash.  Neurological: Negative for headaches.  Endo/Heme/Allergies: Does not bruise/bleed easily.  Psychiatric/Behavioral: The patient is not nervous/anxious.    All other systems reviewed and are negative.       Objective:     Physical Examination:    Vital Signs: VITALS@    Body mass index is 30.2 kg/m².    This a well-developed, well nourished patient in no acute distress.  They are alert and oriented and cooperative to examination.    Left knee exam: Skin overlying the left knee is clean dry and intact.  No erythema or ecchymosis.  No signs or symptoms of infection.  Range of motion of the left knee 0-110 degrees.  Crepitus upon flexion-extension of the left knee.  No tenderness to the medial and lateral joint line.  Stable varus valgus stress.  Negative anterior-posterior drawer test.  Negative Homans on the left.  Negative straight leg raise test on the left.  Distally neurovascularly intact.    Left hip exam: Skin overlying the left hip is clean dry and intact.  No erythema or ecchymosis.  No signs or symptoms of infection.  Significant tenderness to the left greater trochanter.  Good range of motion of the  left hip with internal or external rotation.  No groin pain with rigorous motion of the left hip.  Negative Homans on the left.  Negative straight leg raise test on the left.  Distally neurovascularly intact.      Pertinent New Results:      XRAY Report / Interpretation:   Three views of the left knee taken in clinic today reveal no acute fractures or dislocations.  Moderate joint space narrowing patellofemoral.  Visualized soft tissues unremarkable.  AP view of the left hip taken in clinic today reveal no acute fractures or dislocations.  Mild-to-moderate joint space narrowing of the femoroacetabular joint.  Visualized soft tissues unremarkable.    Procedure/s:  Large Joint Aspiration/Injection: L knee    Date/Time: 6/9/2025 8:30 AM    Performed by: Ankush Reddy PA-C  Authorized by: Ankush Reddy PA-C    Consent Done?:  Yes (Verbal)  Indications:  Pain  Timeout: prior to procedure the correct patient, procedure, and site was verified    Prep: patient was prepped and draped in usual sterile fashion      Local anesthesia used?: Yes    Local anesthetic:  Lidocaine 1% without epinephrine    Details:  Needle Size:  22 G  Ultrasonic Guidance for needle placement?: No    Approach:  Anterolateral  Location:  Knee  Site:  L knee  Medications:  40 mg triamcinolone acetonide 40 mg/mL  Patient tolerance:  Patient tolerated the procedure well with no immediate complications  Large Joint Aspiration/Injection: L greater trochanteric bursa    Date/Time: 6/9/2025 8:30 AM    Performed by: Ankush Reddy PA-C  Authorized by: Ankush Reddy PA-C    Indications:  Pain  Timeout: prior to procedure the correct patient, procedure, and site was verified    Prep: patient was prepped and draped in usual sterile fashion      Local anesthesia used?: Yes    Local anesthetic:  Lidocaine 1% without epinephrine    Details:  Needle Size:  22 G  Approach:  Lateral  Location:  Hip  Site:  L greater trochanteric bursa  Medications:  40 mg  triamcinolone acetonide 40 mg/mL  Patient tolerance:  Patient tolerated the procedure well with no immediate complications         Assessment:     1. Greater trochanteric bursitis, left    2. Primary osteoarthritis of left knee      Plan:    Greater trochanteric bursitis, left  -     X-Ray Knee 3 View Left  -     X-Ray Hip 1 View Left (with Pelvis when performed)  -     Large Joint Aspiration/Injection: L greater trochanteric bursa    Primary osteoarthritis of left knee  -     Large Joint Aspiration/Injection: L knee        Follow up in about 6 weeks (around 7/21/2025) for L hip/knee inj 6/9/25.    Left knee osteoarthritis, trochanteric bursitis of the left hip.  I believe that his left knee pain is due to an acute exacerbation of underlying patellofemoral arthritis of the left knee.  I injected his left knee today anterolateral approach with corticosteroid 40 mg Kenalog.  He tolerated this well.  I also encouraged him to focus on quad strengthening at the gym for prevention of his left knee pain from recurring.  I believe that his chronic left hip pain is due to chronic trochanteric bursitis of the left hip.  I also injected his left hip at the left trochanteric bursa with corticosteroid 40 mg Kenalog.  He tolerated this well.  I encouraged him to continue the use of meloxicam once a day for his musculoskeletal inflammatory symptoms.  I would like him to follow up in 6 weeks to reassess his left knee and left hip pain.    Receiving a steroid injection is a simple, in-office procedure.     After your injection, keep the following in mind:     In the first 48 hours after a steroid injection...     -You should be able to resume your normal day-to-day activities     -If you have any mild pain or swelling at the injection site, place an ice pack on the injection site for 10 minutes or as recommended by your doctor     In the months after an injection...     -Everyone responds differently so when your pain returns,  schedule a follow up appointment     Corticosteroid injections are generally safe but do carry potential risks.  Local side effects may include, but is not limited to: postinjection flare, skin hypopigmentation and atrophy, infection, tendon rupture, accelerated progression of osteoarthritis, and osseous injury. Systemic side effects may include, but is not limited to: facial flushing, hypertension, and hyperglycemia.    The patient and I had a thorough discussion today.  We discussed the working diagnosis as well as several other potential alternative diagnoses.  We discussed treatment options, both conservative and surgical.  Conservative treatment options would include things such as activity modifications, workplace modifications, a period of rest, oral versus topical over the counter and oral versus topical prescription anti-inflammatory medications, physical therapy / occupational therapy, splinting / bracing, immobilization, corticosteroid injections, and others.      Ankush Reddy PA-C      EMR Statement:  Please note that portions of this patient encounter record were imported from the patients electronic medical record and that others were dictated using voice recognition software. For these reasons grammatical errors, nonsensical language, and apparently contradictory statements may be present.  These should be disregarded or interpreted with respect to the context of the document.       [1]   Social History  Socioeconomic History    Marital status:    Occupational History     Employer: [employer]   Tobacco Use    Smoking status: Former     Current packs/day: 0.00     Average packs/day: 2.0 packs/day for 20.0 years (40.0 ttl pk-yrs)     Types: Cigarettes     Start date: 1973     Quit date: 1993     Years since quittin.1     Passive exposure: Past    Smokeless tobacco: Never   Substance and Sexual Activity    Alcohol use: Not Currently     Alcohol/week: 6.0 standard drinks of  alcohol     Types: 6 Cans of beer per week     Comment: SOCIALLY     Drug use: No    Sexual activity: Yes     Partners: Female     Social Drivers of Health     Financial Resource Strain: Low Risk  (6/4/2025)    Overall Financial Resource Strain (CARDIA)     Difficulty of Paying Living Expenses: Not hard at all   Food Insecurity: No Food Insecurity (6/4/2025)    Hunger Vital Sign     Worried About Running Out of Food in the Last Year: Never true     Ran Out of Food in the Last Year: Never true   Transportation Needs: No Transportation Needs (6/4/2025)    PRAPARE - Transportation     Lack of Transportation (Medical): No     Lack of Transportation (Non-Medical): No   Physical Activity: Sufficiently Active (6/4/2025)    Exercise Vital Sign     Days of Exercise per Week: 3 days     Minutes of Exercise per Session: 60 min   Stress: No Stress Concern Present (6/4/2025)    [employer] [place] [employer] - Occupational Stress Questionnaire     Feeling of Stress : Only a little   Housing Stability: Low Risk  (6/4/2025)    Housing Stability Vital Sign     Unable to Pay for Housing in the Last Year: No     Number of Times Moved in the Last Year: 0     Homeless in the Last Year: No

## 2025-06-16 ENCOUNTER — ANESTHESIA EVENT (OUTPATIENT)
Dept: ENDOSCOPY | Facility: HOSPITAL | Age: 65
End: 2025-06-16
Payer: COMMERCIAL

## 2025-06-16 ENCOUNTER — ANESTHESIA (OUTPATIENT)
Dept: ENDOSCOPY | Facility: HOSPITAL | Age: 65
End: 2025-06-16
Payer: COMMERCIAL

## 2025-06-16 ENCOUNTER — HOSPITAL ENCOUNTER (OUTPATIENT)
Facility: HOSPITAL | Age: 65
Discharge: HOME | End: 2025-06-16
Attending: INTERNAL MEDICINE | Admitting: INTERNAL MEDICINE
Payer: COMMERCIAL

## 2025-06-16 DIAGNOSIS — Z12.11 SCREEN FOR COLON CANCER: ICD-10-CM

## 2025-06-16 DIAGNOSIS — K63.5 POLYP OF COLON, UNSPECIFIED PART OF COLON, UNSPECIFIED TYPE: Primary | ICD-10-CM

## 2025-06-16 DIAGNOSIS — K57.90 DIVERTICULOSIS: ICD-10-CM

## 2025-06-16 DIAGNOSIS — K64.8 INTERNAL HEMORRHOIDS: ICD-10-CM

## 2025-06-16 PROCEDURE — 27201089 HC SNARE, DISP (ANY): Performed by: INTERNAL MEDICINE

## 2025-06-16 PROCEDURE — 37000009 HC ANESTHESIA EA ADD 15 MINS: Performed by: INTERNAL MEDICINE

## 2025-06-16 PROCEDURE — 45385 COLONOSCOPY W/LESION REMOVAL: CPT | Mod: 33,,, | Performed by: INTERNAL MEDICINE

## 2025-06-16 PROCEDURE — 27201012 HC FORCEPS, HOT/COLD, DISP: Performed by: INTERNAL MEDICINE

## 2025-06-16 PROCEDURE — 45385 COLONOSCOPY W/LESION REMOVAL: CPT | Mod: 33 | Performed by: INTERNAL MEDICINE

## 2025-06-16 PROCEDURE — 63600175 PHARM REV CODE 636 W HCPCS: Performed by: NURSE ANESTHETIST, CERTIFIED REGISTERED

## 2025-06-16 PROCEDURE — 37000008 HC ANESTHESIA 1ST 15 MINUTES: Performed by: INTERNAL MEDICINE

## 2025-06-16 PROCEDURE — 88305 TISSUE EXAM BY PATHOLOGIST: CPT | Mod: TC,59 | Performed by: PATHOLOGY

## 2025-06-16 PROCEDURE — 25000003 PHARM REV CODE 250: Performed by: INTERNAL MEDICINE

## 2025-06-16 PROCEDURE — 45380 COLONOSCOPY AND BIOPSY: CPT | Mod: 33,XS | Performed by: INTERNAL MEDICINE

## 2025-06-16 PROCEDURE — 45380 COLONOSCOPY AND BIOPSY: CPT | Mod: 33,XS,, | Performed by: INTERNAL MEDICINE

## 2025-06-16 RX ORDER — LIDOCAINE HYDROCHLORIDE 20 MG/ML
INJECTION INTRAVENOUS
Status: DISCONTINUED | OUTPATIENT
Start: 2025-06-16 | End: 2025-06-16

## 2025-06-16 RX ORDER — SODIUM CHLORIDE 9 MG/ML
INJECTION, SOLUTION INTRAVENOUS CONTINUOUS
Status: DISCONTINUED | OUTPATIENT
Start: 2025-06-16 | End: 2025-06-16 | Stop reason: HOSPADM

## 2025-06-16 RX ORDER — PROPOFOL 10 MG/ML
VIAL (ML) INTRAVENOUS
Status: DISCONTINUED | OUTPATIENT
Start: 2025-06-16 | End: 2025-06-16

## 2025-06-16 RX ADMIN — PROPOFOL 100 MG: 10 INJECTION, EMULSION INTRAVENOUS at 07:06

## 2025-06-16 RX ADMIN — PROPOFOL 50 MG: 10 INJECTION, EMULSION INTRAVENOUS at 07:06

## 2025-06-16 RX ADMIN — SODIUM CHLORIDE: 9 INJECTION, SOLUTION INTRAVENOUS at 07:06

## 2025-06-16 RX ADMIN — LIDOCAINE HYDROCHLORIDE 100 MG: 20 INJECTION, SOLUTION INTRAVENOUS at 07:06

## 2025-06-16 NOTE — H&P
CC: History of colon polyps    64 year old male with above. States that symptoms are absent, no alleviating/exacerbating factors. Positive family history of colorectal CA. Positive personal history of polyps. No bleeding or weight loss.     ROS:  No headache, no fever/chills, no chest pain/SOB, no nausea/vomiting/diarrhea/constipation/GI bleeding/abdominal pain, no dysuria/hematuria except where otherwise indicated above.    VSSAF   Exam:   Alert and oriented x 3; no apparent distress   PERRLA, sclera anicteric  CV: Regular rate/rhythm, normal PMI   Lungs: Clear bilaterally with no wheeze/rales   Abdomen: Soft, NT/ND, normal bowel sounds   Ext: No cyanosis, clubbing     Impression:   As above    Plan:   Proceed with endoscopy. Further recs to follow.

## 2025-06-16 NOTE — PROVATION PATIENT INSTRUCTIONS
Discharge Summary/Instructions after an Endoscopic Procedure  Patient Name: Jose Singh  Patient MRN: [id number]  Patient YOB: 1960 Monday, June 16, 2025  Larry Ly MD  Dear patient,  As a result of recent federal legislation (The Federal Cures Act), you may   receive lab or pathology results from your procedure in your [facility]   account before your physician is able to contact you. Your physician or   their representative will relay the results to you with their   recommendations at their soonest availability.  Thank you,  RESTRICTIONS:  During your procedure today, you received medications for sedation.  These   medications may affect your judgment, balance and coordination.  Therefore,   for 24 hours, you have the following restrictions:   - DO NOT drive a car, operate machinery, make legal/financial decisions,   sign important papers or drink alcohol.    ACTIVITY:  Today: no heavy lifting, straining or running due to procedural   sedation/anesthesia.  The following day: return to full activity including work.  DIET:  Eat and drink normally unless instructed otherwise.     TREATMENT FOR COMMON SIDE EFFECTS:  - Mild abdominal pain, nausea, belching, bloating or excessive gas:  rest,   eat lightly and use a heating pad.  - Sore Throat: treat with throat lozenges and/or gargle with warm salt   water.  - Because air was used during the procedure, expelling large amounts of air   from your rectum or belching is normal.  - If a bowel prep was taken, you may not have a bowel movement for 1-3 days.    This is normal.  SYMPTOMS TO WATCH FOR AND REPORT TO YOUR PHYSICIAN:  1. Abdominal pain or bloating, other than gas cramps.  2. Chest pain.  3. Back pain.  4. Signs of infection such as: chills or fever occurring within 24 hours   after the procedure.  5. Rectal bleeding, which would show as bright red, maroon, or black stools.   (A tablespoon of blood from the rectum is not serious, especially if    hemorrhoids are present.)  6. Vomiting.  7. Weakness or dizziness.  GO DIRECTLY TO THE NEAREST EMERGENCY ROOM IF YOU HAVE ANY OF THE FOLLOWING:      Difficulty breathing              Chills and/or fever over 101 F   Persistent vomiting and/or vomiting blood   Severe abdominal pain   Severe chest pain   Black, tarry stools   Bleeding- more than one tablespoon   Any other symptom or condition that you feel may need urgent attention  Your doctor recommends these additional instructions:  If any biopsies were taken, your doctors clinic will contact you in 1 to 2   weeks with any results.  - Patient has a contact number available for emergencies.  The signs and   symptoms of potential delayed complications were discussed with the   patient.  Return to normal activities tomorrow.  Written discharge   instructions were provided to the patient.   - High fiber diet.   - Continue present medications.   - Await pathology results.   - Repeat colonoscopy in 3 years for surveillance.   - Discharge patient to home (ambulatory).   - Return to GI office PRN.  For questions, problems or results please call your physician - Larry Ly MD at Work:  [phone].  [facility], EMERGENCY ROOM PHONE NUMBER: [phone]  IF A COMPLICATION OR EMERGENCY SITUATION ARISES AND YOU ARE UNABLE TO REACH   YOUR PHYSICIAN - GO DIRECTLY TO THE EMERGENCY ROOM.  Larry Ly MD  6/16/2025 7:57:13 AM  This report has been verified and signed electronically.  Dear patient,  As a result of recent federal legislation (The Federal Cures Act), you may   receive lab or pathology results from your procedure in your [facility]   account before your physician is able to contact you. Your physician or   their representative will relay the results to you with their   recommendations at their soonest availability.  Thank you,  PROVATION

## 2025-06-16 NOTE — PLAN OF CARE
Vss, rasta po fluids, denies pain, ambulates easily. IV removed, catheter intact. Discharge instructions provided and states understanding. States ready to go home.  Discharged from facility with family per wheelchair.

## 2025-06-16 NOTE — ANESTHESIA PREPROCEDURE EVALUATION
06/16/2025  Jose Singh is a 64 y.o., male.      Pre-op Assessment          Review of Systems  Hepatic/GI:     GERD         Gerd          Neurological:    Neuromuscular Disease,                                 Neuromuscular Disease       Physical Exam  General: Well nourished        Anesthesia Plan  Type of Anesthesia, risks & benefits discussed:    Anesthesia Type: Gen Natural Airway  Intra-op Monitoring Plan: Standard ASA Monitors  Induction:  IV  Informed Consent: Informed consent signed with the Patient and all parties understand the risks and agree with anesthesia plan.  All questions answered.   ASA Score: 2  Day of Surgery Review of History & Physical: H&P Update referred to the surgeon/provider.    Ready For Surgery From Anesthesia Perspective.     .

## 2025-06-16 NOTE — TRANSFER OF CARE
Anesthesia Transfer of Care Note    Patient: Jose Singh    Procedure(s) Performed: Procedure(s) (LRB):  COLONOSCOPY (N/A)    Patient location: GI    Anesthesia Type: general    Transport from OR: Transported from OR on room air with adequate spontaneous ventilation    Post pain: adequate analgesia    Post assessment: no apparent anesthetic complications    Post vital signs: stable    Level of consciousness: sedated    Nausea/Vomiting: no nausea/vomiting    Complications: none    Transfer of care protocol was followed      Last vitals: Visit Vitals  BP (!) 157/85 (BP Location: Left arm, Patient Position: Lying)   Pulse 62   Temp 36.7 °C (98.1 °F) (Skin)   Resp 16   Wt 104.3 kg (230 lb)   SpO2 98%   BMI 29.53 kg/m²

## 2025-06-16 NOTE — ANESTHESIA POSTPROCEDURE EVALUATION
Anesthesia Post Evaluation    Patient: Jose Singh    Procedure(s) Performed: Procedure(s) (LRB):  COLONOSCOPY (N/A)    Final Anesthesia Type: general      Patient location during evaluation: PACU  Patient participation: Yes- Able to Participate  Level of consciousness: awake and alert  Post-procedure vital signs: reviewed and stable  Pain management: adequate  Airway patency: patent    PONV status at discharge: No PONV  Anesthetic complications: no      Cardiovascular status: blood pressure returned to baseline  Respiratory status: unassisted and room air  Hydration status: euvolemic  Follow-up not needed.              Vitals Value Taken Time   /95 06/16/25 08:25   Temp 36.7 °C (98.1 °F) 06/16/25 08:25   Pulse 68 06/16/25 08:26   Resp 19 06/16/25 08:26   SpO2 97 % 06/16/25 08:26   Vitals shown include unfiled device data.      Event Time   Out of Recovery 08:40:28         Pain/López Score: No data recorded

## 2025-06-17 ENCOUNTER — RESULTS FOLLOW-UP (OUTPATIENT)
Dept: GASTROENTEROLOGY | Facility: CLINIC | Age: 65
End: 2025-06-17

## 2025-06-17 VITALS
OXYGEN SATURATION: 97 % | TEMPERATURE: 98 F | DIASTOLIC BLOOD PRESSURE: 95 MMHG | BODY MASS INDEX: 29.53 KG/M2 | RESPIRATION RATE: 28 BRPM | WEIGHT: 230 LBS | HEART RATE: 66 BPM | SYSTOLIC BLOOD PRESSURE: 141 MMHG

## 2025-06-30 ENCOUNTER — HOSPITAL ENCOUNTER (OUTPATIENT)
Dept: RADIOLOGY | Facility: HOSPITAL | Age: 65
Discharge: HOME | End: 2025-06-30
Attending: SPECIALIST
Payer: COMMERCIAL

## 2025-06-30 DIAGNOSIS — R97.20 ELEVATED PROSTATE SPECIFIC ANTIGEN (PSA): ICD-10-CM

## 2025-06-30 PROCEDURE — 72197 MRI PELVIS W/O & W/DYE: CPT | Mod: 26,,, | Performed by: RADIOLOGY

## 2025-06-30 PROCEDURE — 25500020 PHARM REV CODE 255

## 2025-06-30 PROCEDURE — 72197 MRI PELVIS W/O & W/DYE: CPT | Mod: TC

## 2025-06-30 PROCEDURE — A9585 GADOBUTROL INJECTION: HCPCS

## 2025-06-30 RX ORDER — GADOBUTROL 604.72 MG/ML
INJECTION INTRAVENOUS
Status: COMPLETED
Start: 2025-06-30 | End: 2025-06-30

## 2025-06-30 RX ADMIN — GADOBUTROL 10 ML: 604.72 INJECTION INTRAVENOUS at 04:06

## 2025-08-08 ENCOUNTER — TELEPHONE (OUTPATIENT)
Dept: ORTHOPEDICS | Facility: CLINIC | Age: 65
End: 2025-08-08
Payer: MEDICARE

## 2025-08-20 DIAGNOSIS — M15.0 PRIMARY OSTEOARTHRITIS INVOLVING MULTIPLE JOINTS: ICD-10-CM

## 2025-08-20 RX ORDER — MELOXICAM 15 MG/1
15 TABLET ORAL DAILY PRN
Qty: 90 TABLET | Refills: 1 | Status: SHIPPED | OUTPATIENT
Start: 2025-08-20

## 2025-08-29 DIAGNOSIS — E78.5 DYSLIPIDEMIA: ICD-10-CM

## 2025-08-29 RX ORDER — ATORVASTATIN CALCIUM 40 MG/1
40 TABLET, FILM COATED ORAL NIGHTLY
Qty: 90 TABLET | Refills: 3 | Status: SHIPPED | OUTPATIENT
Start: 2025-08-29

## (undated) DEVICE — SOLUTION IRRI H2O BOTTLE 1000ML

## (undated) DEVICE — EVACUATOR UROVAC 730125

## (undated) DEVICE — SYRINGE 30ML 302832

## (undated) DEVICE — STRAP TABLE 5X72 M5173

## (undated) DEVICE — ELECTRO LOOP 24FR MEDIUM

## (undated) DEVICE — TUBING CYSTO SINGLE V4608-20

## (undated) DEVICE — SOLUTION H2O IRRIGATION 3000ML R8006

## (undated) DEVICE — TUBING SUCTION 12' ST2003

## (undated) DEVICE — SOLUTION IRRI NS BOTTLE 1000ML R5200-01

## (undated) DEVICE — PACK CYSTOSCOPY III

## (undated) DEVICE — BAG DRAINAGE 4000ML 153509

## (undated) DEVICE — UNDERGLOVE BIOGEL PI MICRO BLUE SZ 6.5

## (undated) DEVICE — JELLY LUBRICATING TUBE 4OZ 4OZLUB

## (undated) DEVICE — SYRINGE CATH TIP 60ML 309620

## (undated) DEVICE — TUBING CYSTO DOUBLE 654301

## (undated) DEVICE — CATHETER STATLOCK ADULT

## (undated) DEVICE — GLOVE #7.5 BIOGEL 30475

## (undated) DEVICE — CATHETER 3 WAY 24FR 30CC COUNCIL

## (undated) DEVICE — SCRUB BACTOSHIELD 134439

## (undated) DEVICE — SOLUTION NACL 0.9% 3000ML